# Patient Record
Sex: FEMALE | Race: WHITE | Employment: OTHER | URBAN - METROPOLITAN AREA
[De-identification: names, ages, dates, MRNs, and addresses within clinical notes are randomized per-mention and may not be internally consistent; named-entity substitution may affect disease eponyms.]

---

## 2017-03-07 ENCOUNTER — OFFICE VISIT (OUTPATIENT)
Dept: INTERNAL MEDICINE CLINIC | Facility: CLINIC | Age: 62
End: 2017-03-07

## 2017-03-07 ENCOUNTER — TELEPHONE (OUTPATIENT)
Dept: INTERNAL MEDICINE CLINIC | Facility: CLINIC | Age: 62
End: 2017-03-07

## 2017-03-07 VITALS
SYSTOLIC BLOOD PRESSURE: 118 MMHG | RESPIRATION RATE: 12 BRPM | HEIGHT: 64 IN | WEIGHT: 158.81 LBS | DIASTOLIC BLOOD PRESSURE: 76 MMHG | BODY MASS INDEX: 27.11 KG/M2 | HEART RATE: 59 BPM | TEMPERATURE: 98 F

## 2017-03-07 DIAGNOSIS — Z00.00 PHYSICAL EXAM, ANNUAL: ICD-10-CM

## 2017-03-07 DIAGNOSIS — E78.00 HYPERCHOLESTEREMIA: ICD-10-CM

## 2017-03-07 DIAGNOSIS — R25.3 MUSCLE TWITCH: ICD-10-CM

## 2017-03-07 DIAGNOSIS — Z78.0 POSTMENOPAUSAL: ICD-10-CM

## 2017-03-07 DIAGNOSIS — Z12.31 SCREENING MAMMOGRAM, ENCOUNTER FOR: ICD-10-CM

## 2017-03-07 DIAGNOSIS — M25.511 CHRONIC RIGHT SHOULDER PAIN: ICD-10-CM

## 2017-03-07 DIAGNOSIS — G89.29 CHRONIC RIGHT SHOULDER PAIN: ICD-10-CM

## 2017-03-07 PROCEDURE — 99396 PREV VISIT EST AGE 40-64: CPT | Performed by: INTERNAL MEDICINE

## 2017-03-07 RX ORDER — BETAMETHASONE VALERATE 1.2 MG/G
AEROSOL, FOAM TOPICAL
Refills: 2 | COMMUNITY
Start: 2017-01-10 | End: 2017-10-25

## 2017-03-07 NOTE — PROGRESS NOTES
[facility]    HPI:   Adrián Mercer is a 64year old female who presents for a complete physical exam. Symptoms: denies discharge, itching, burning or dysuria. Hyperlipidemia: on simvastatin. Tolerating well. No muscle aches.      Complains o arthroscopy   • Hot flashes 2/1/12   • Routine general medical examination at a health care facility 2/1/12   • Osteopenia 11/3/11     severe, bordering on OP   • Lipid screening 7/3/12   • Shingles 5/15          Past Surgical History    FEMUR/KNEE SURG UN (36.4 °C) (Oral)  Resp 12  Ht 64\"  Wt 158 lb 12.8 oz  BMI 27.24 kg/m2  Body mass index is 27.24 kg/(m^2).    GENERAL: well developed, well nourished,in no apparent distress  SKIN: no rashes,no suspicious lesions  HEENT: atraumatic, normocephalic,ears and t 09:16 AM   TRIG 94 05/14/2016 09:16 AM    05/14/2016 09:16 AM   NONHDLC 142 05/14/2016 09:16 AM       No results found for: A1C, HGBA1C   Vitamin D:    No results found for: VITD        ASSESSMENT AND PLAN:   Valeri Living is a 64year old female

## 2017-03-10 LAB
ABSOLUTE BASOPHILS: 28 CELLS/UL (ref 0–200)
ABSOLUTE EOSINOPHILS: 118 CELLS/UL (ref 15–500)
ABSOLUTE LYMPHOCYTES: 1484 CELLS/UL (ref 850–3900)
ABSOLUTE MONOCYTES: 325 CELLS/UL (ref 200–950)
ABSOLUTE NEUTROPHILS: 3646 CELLS/UL (ref 1500–7800)
ALBUMIN/GLOBULIN RATIO: 1.4 (CALC) (ref 1–2.5)
ALBUMIN: 4.3 G/DL (ref 3.6–5.1)
ALKALINE PHOSPHATASE: 82 U/L (ref 33–130)
ALT: 15 U/L (ref 6–29)
AST: 16 U/L (ref 10–35)
BASOPHILS: 0.5 %
BILIRUBIN, TOTAL: 0.4 MG/DL (ref 0.2–1.2)
BUN: 11 MG/DL (ref 7–25)
CALCIUM: 9.3 MG/DL (ref 8.6–10.4)
CARBON DIOXIDE: 28 MMOL/L (ref 20–31)
CHLORIDE: 105 MMOL/L (ref 98–110)
CHOL/HDLC RATIO: 3.3 (CALC)
CHOLESTEROL, TOTAL: 210 MG/DL (ref 125–200)
CREATININE: 0.9 MG/DL (ref 0.5–0.99)
EGFR IF AFRICN AM: 80 ML/MIN/1.73M2
EGFR IF NONAFRICN AM: 69 ML/MIN/1.73M2
EOSINOPHILS: 2.1 %
FOLATE, SERUM: >24 NG/ML
GLOBULIN: 3 G/DL (CALC) (ref 1.9–3.7)
GLUCOSE: 92 MG/DL (ref 65–99)
HDL CHOLESTEROL: 63 MG/DL
HEMATOCRIT: 39.3 % (ref 35–45)
HEMOGLOBIN: 13.1 G/DL (ref 11.7–15.5)
LDL-CHOLESTEROL: 131 MG/DL (CALC)
LYMPHOCYTES: 26.5 %
MCH: 29.7 PG (ref 27–33)
MCHC: 33.4 G/DL (ref 32–36)
MCV: 88.9 FL (ref 80–100)
MONOCYTES: 5.8 %
MPV: 9.7 FL (ref 7.5–12.5)
NEUTROPHILS: 65.1 %
NON-HDL CHOLESTEROL: 147 MG/DL (CALC)
PLATELET COUNT: 211 THOUSAND/UL (ref 140–400)
POTASSIUM: 4.5 MMOL/L (ref 3.5–5.3)
PROTEIN, TOTAL: 7.3 G/DL (ref 6.1–8.1)
RDW: 12.9 % (ref 11–15)
RED BLOOD CELL COUNT: 4.42 MILLION/UL (ref 3.8–5.1)
SODIUM: 140 MMOL/L (ref 135–146)
TRIGLYCERIDES: 78 MG/DL
TSH: 1.55 MIU/L (ref 0.4–4.5)
VITAMIN B12: 477 PG/ML (ref 200–1100)
VITAMIN D, 25-OH, TOTAL: 38 NG/ML (ref 30–100)
WHITE BLOOD CELL COUNT: 5.6 THOUSAND/UL (ref 3.8–10.8)

## 2017-04-06 ENCOUNTER — TELEPHONE (OUTPATIENT)
Dept: INTERNAL MEDICINE CLINIC | Facility: CLINIC | Age: 62
End: 2017-04-06

## 2017-04-06 NOTE — TELEPHONE ENCOUNTER
Patient called and is leaving work, not feeling well at all wanted to possibly be seen today. I offered patient spot with Dr. Eloisa Toney at 3:00, but patient did not want that time.  Patient did seem interested in a follow up appointment for tomorrow with Dr. Smiley Velasquez

## 2017-04-06 NOTE — TELEPHONE ENCOUNTER
[facility] [place] on 3/29/17. Patient was diagnosed with strep throat and gave the patient amoxicillin, patient said she was a little better and the pain her throat is gone.  But the patient is starting back

## 2017-04-06 NOTE — TELEPHONE ENCOUNTER
Left message on pt's voice mail. Pt asked to call back with condition update.   Faxed Medical Records request to [facility] in [place] at [address]

## 2017-04-07 NOTE — TELEPHONE ENCOUNTER
Incoming (mail or fax): Fax  Received from:  [facility] Notes from [facility]. Documentation given to:  Placed on [employer].

## 2017-04-10 NOTE — TELEPHONE ENCOUNTER
Received fax from [id number] re:  Pt visit 3/29/17 and 4/6/17. To consult folder. Spoke with pt. States that she has completed 10-day course of ATB and is better. States that she has no nasal drainage and aches are gone.   States that

## 2017-05-15 PROBLEM — M75.01 ADHESIVE CAPSULITIS OF RIGHT SHOULDER: Status: ACTIVE | Noted: 2017-05-15

## 2017-06-02 DIAGNOSIS — E78.00 HYPERCHOLESTEREMIA: ICD-10-CM

## 2017-06-02 RX ORDER — SIMVASTATIN 10 MG
10 TABLET ORAL
Qty: 90 TABLET | Refills: 0 | Status: SHIPPED | OUTPATIENT
Start: 2017-06-02 | End: 2017-09-08

## 2017-06-02 NOTE — TELEPHONE ENCOUNTER
From: Edwardo Sargent  To:  Eduardo Oseguera MD  Sent: 6/2/2017 6:36 AM CDT  Subject: Medication Renewal Request    Original authorizing provider: MD Edwardo Barbosa would like a refill of the following medications:  simvastatin 10 MG Oral Ta

## 2017-07-20 ENCOUNTER — TELEPHONE (OUTPATIENT)
Dept: INTERNAL MEDICINE CLINIC | Facility: CLINIC | Age: 62
End: 2017-07-20

## 2017-07-20 DIAGNOSIS — N63.0 BREAST NODULE: Primary | ICD-10-CM

## 2017-07-20 NOTE — TELEPHONE ENCOUNTER
Noted below. See imaging 16 test scan, repeat Left breast US recommended. See Imaging 16 US order . [facility] requesting order for left breast US limited. Please advise. OK to order? Order pended.

## 2017-07-20 NOTE — TELEPHONE ENCOUNTER
MARGARITA called to request an order for a left breast ultrasound limited. She has a mammogram scheduled for July 26.   Please fax the order to [phone]

## 2017-07-21 NOTE — TELEPHONE ENCOUNTER
Looks like last breast ultrasound was ordered by dr. Gini Carter. Has she been getting mammograms from his office? She should call their office to have them do another order for her if so.

## 2017-07-21 NOTE — TELEPHONE ENCOUNTER
Called MARGARITA, advised that we did not order mammogram and to call Dr Donn Leal office for breast US order.

## 2017-07-25 ENCOUNTER — TELEPHONE (OUTPATIENT)
Dept: INTERNAL MEDICINE CLINIC | Facility: CLINIC | Age: 62
End: 2017-07-25

## 2017-07-25 NOTE — TELEPHONE ENCOUNTER
Script was sent to last pharmacy on 6/2/17, regardless of which pharmacy she is going to now, it is refill too soon if she picked that one up.  Pt to call back if she never picked up script, otherwise she can make request end of august for refill to new pha

## 2017-07-25 NOTE — TELEPHONE ENCOUNTER
Patient need new rx of Simvastatin 10 mg, 3 month supply,  at new pharmacy - [facility] [address]  [address] [place]

## 2017-07-27 ENCOUNTER — TELEPHONE (OUTPATIENT)
Dept: INTERNAL MEDICINE CLINIC | Facility: CLINIC | Age: 62
End: 2017-07-27

## 2017-07-27 NOTE — TELEPHONE ENCOUNTER
Incoming (mail or fax): fax  Received from:  [facility]  Documentation given to:  triage

## 2017-07-28 NOTE — TELEPHONE ENCOUNTER
Spoke with pt, advised of results and recommendations.  Copy of dexa scan mailed to pt per pt request.

## 2017-07-28 NOTE — TELEPHONE ENCOUNTER
Mammogram benign. Repeat in 1 year. dexa shows osteopenia, slightly worse in lumbar spine when compared to previous dexa. Take calcium and vit d supplements regularly. Do regular weight bearing exercises. Please inform pt.

## 2017-07-31 ENCOUNTER — TELEPHONE (OUTPATIENT)
Dept: INTERNAL MEDICINE CLINIC | Facility: CLINIC | Age: 62
End: 2017-07-31

## 2017-07-31 NOTE — TELEPHONE ENCOUNTER
Dexa Bone Scan report received from [facility]. Date of exam: 7/26/17.

## 2017-07-31 NOTE — TELEPHONE ENCOUNTER
Incoming (mail or fax):  mail  Received from:  [facility]  Documentation given to:  triage

## 2017-08-15 ENCOUNTER — TELEPHONE (OUTPATIENT)
Dept: INTERNAL MEDICINE CLINIC | Facility: CLINIC | Age: 62
End: 2017-08-15

## 2017-08-15 NOTE — TELEPHONE ENCOUNTER
Spoke with pt, strongly recommended she see gyne to evaluate cause for postmenopausal bleeding as this is not normal. Gave contact info for Jarred Abdullahi and Geronimo Walker.  Pt used to see Dr Reid Tesfaye so will try that office first. Reminded pt she is PPO so can see d

## 2017-08-15 NOTE — TELEPHONE ENCOUNTER
Patient called and wanted to talk to Dr. Concepcion Goodell nurse, patient had a couple questions/concerns and wanted to know if she needs a appointment. Patient said she just started to bleed today, after 6 years of menopause. Please advise patient, thank you!

## 2017-08-16 ENCOUNTER — OFFICE VISIT (OUTPATIENT)
Dept: OBGYN CLINIC | Facility: CLINIC | Age: 62
End: 2017-08-16

## 2017-08-16 VITALS
SYSTOLIC BLOOD PRESSURE: 112 MMHG | WEIGHT: 160 LBS | RESPIRATION RATE: 12 BRPM | HEIGHT: 64 IN | TEMPERATURE: 99 F | DIASTOLIC BLOOD PRESSURE: 84 MMHG | BODY MASS INDEX: 27.31 KG/M2 | HEART RATE: 60 BPM

## 2017-08-16 DIAGNOSIS — R35.0 URINARY FREQUENCY: ICD-10-CM

## 2017-08-16 DIAGNOSIS — N95.0 POST-MENOPAUSAL BLEEDING: Primary | ICD-10-CM

## 2017-08-16 LAB
APPEARANCE: CLEAR
BILIRUBIN: NEGATIVE
GLUCOSE (URINE DIPSTICK): NEGATIVE MG/DL
KETONES (URINE DIPSTICK): NEGATIVE MG/DL
MULTISTIX LOT#: NORMAL NUMERIC
NITRITE, URINE: NEGATIVE
OCCULT BLOOD: NEGATIVE
PH, URINE: 7 (ref 4.5–8)
PROTEIN (URINE DIPSTICK): NEGATIVE MG/DL
SPECIFIC GRAVITY: >=1.03 (ref 1–1.03)
URINE-COLOR: YELLOW
UROBILINOGEN,SEMI-QN: 0.2 MG/DL (ref 0–1.9)

## 2017-08-16 PROCEDURE — 81002 URINALYSIS NONAUTO W/O SCOPE: CPT | Performed by: OBSTETRICS & GYNECOLOGY

## 2017-08-16 PROCEDURE — 87086 URINE CULTURE/COLONY COUNT: CPT | Performed by: OBSTETRICS & GYNECOLOGY

## 2017-08-16 PROCEDURE — 99204 OFFICE O/P NEW MOD 45 MIN: CPT | Performed by: OBSTETRICS & GYNECOLOGY

## 2017-08-16 PROCEDURE — 88175 CYTOPATH C/V AUTO FLUID REDO: CPT | Performed by: OBSTETRICS & GYNECOLOGY

## 2017-08-16 NOTE — PROGRESS NOTES
Pt c/o post  Menopausal bleeding. Has not been sexually active for many years. No cramping. Pt is experiencing urinary frequency.

## 2017-08-16 NOTE — PROGRESS NOTES
GYN H&P     2017  2:29 PM    CC: Patient is here to establish care with PMB noticed yesterday. Also noticed some urgency of urination    HPI: Patient is a 58year old  . Notice mall amount of blood on toilet paper 2 times.  Sure it was vaginal a COLONOSCOPY      Comment: Repeat in 5 years. Normal colonoscopy  4/27/12: COLONOSCOPY,BIOPSY      Comment: Repeat in 3 years.  colon polyps, internal                hemorrhoids  2008: D & C  2005: FEMUR/KNEE SURG UNLISTED      Comment: L knee repair meniscu perineal support  Urethra: meatus normal   Bladder: well supported  Vagina: normal mucosa, no lesions,  discharge .  No blood today  Uterus: normal size, mobile, nontender  Cervix: normal os, no lesions or bleeding  Adnexa:normal size, bilaterally nontender

## 2017-08-18 LAB — HPV I/H RISK 1 DNA SPEC QL NAA+PROBE: NEGATIVE

## 2017-09-08 DIAGNOSIS — E78.00 HYPERCHOLESTEREMIA: ICD-10-CM

## 2017-09-08 RX ORDER — SIMVASTATIN 10 MG
10 TABLET ORAL
Qty: 90 TABLET | Refills: 0 | Status: SHIPPED | OUTPATIENT
Start: 2017-09-08 | End: 2018-04-09

## 2017-09-08 NOTE — TELEPHONE ENCOUNTER
Was patient advised to contact pharmacy directly?:  Yes - but this is new pharmacy    Is patient out of meds or supply very low?:  Low, 10 left    Medication Requested:  Simvastatin    Dose:  10 mg    Is patient requesting a 30 or 90 day supply?:  90 days

## 2017-10-24 ENCOUNTER — TELEPHONE (OUTPATIENT)
Dept: INTERNAL MEDICINE CLINIC | Facility: CLINIC | Age: 62
End: 2017-10-24

## 2017-10-24 NOTE — TELEPHONE ENCOUNTER
Patient has lower back pain. She is wondering if Dr Derrick Holloway would want to see her or does Dr Derrick Holloway have a recommendation of where she should go. Please advise.

## 2017-10-24 NOTE — TELEPHONE ENCOUNTER
If she's not seen dr for back pain and there are no other symptoms, you can schedule with Dr Albaro Hutchinson.

## 2017-10-25 ENCOUNTER — OFFICE VISIT (OUTPATIENT)
Dept: INTERNAL MEDICINE CLINIC | Facility: CLINIC | Age: 62
End: 2017-10-25

## 2017-10-25 VITALS
DIASTOLIC BLOOD PRESSURE: 58 MMHG | RESPIRATION RATE: 14 BRPM | WEIGHT: 154 LBS | SYSTOLIC BLOOD PRESSURE: 102 MMHG | HEIGHT: 64 IN | HEART RATE: 60 BPM | BODY MASS INDEX: 26.29 KG/M2

## 2017-10-25 DIAGNOSIS — M54.42 ACUTE LEFT-SIDED LOW BACK PAIN WITH LEFT-SIDED SCIATICA: Primary | ICD-10-CM

## 2017-10-25 PROCEDURE — 99213 OFFICE O/P EST LOW 20 MIN: CPT | Performed by: NURSE PRACTITIONER

## 2017-10-25 RX ORDER — CYCLOBENZAPRINE HCL 5 MG
5 TABLET ORAL 3 TIMES DAILY PRN
Qty: 10 TABLET | Refills: 0 | Status: SHIPPED | OUTPATIENT
Start: 2017-10-25 | End: 2018-03-08

## 2017-10-25 RX ORDER — BETAMETHASONE VALERATE 1.2 MG/G
AEROSOL, FOAM TOPICAL
Qty: 1 CAN | Refills: 2 | Status: SHIPPED | OUTPATIENT
Start: 2017-10-25 | End: 2019-03-01

## 2017-10-25 RX ORDER — NAPROXEN 500 MG/1
500 TABLET ORAL 2 TIMES DAILY WITH MEALS
Qty: 14 TABLET | Refills: 0 | Status: SHIPPED | OUTPATIENT
Start: 2017-10-25 | End: 2018-03-08

## 2017-10-25 NOTE — PATIENT INSTRUCTIONS
Take Naproxen, one tab twice daily until all tabs are gone. Space doses 12 hours apart for best effect. TAKE DOSES WITH FOOD. Take one cyclobenzaprine tab nightly, before bed, for three (3) nights. Then make take nightly as needed.  This me

## 2017-10-25 NOTE — PROGRESS NOTES
Patient presents with:  Back Pain: x 1 month. hurts more when going from sitting to standing x last week      HPI:  Presents with approx 1 month history of left lower back pain with a worsening in the past week.  Pain is made worse by going from sitting to (10 mg total) by mouth once daily. Disp: 90 tablet Rfl: 0   acetaminophen 500 MG Oral Tab Take 1,000 mg by mouth.  Disp:  Rfl:        Physical Exam  /58 (BP Location: Left arm, Patient Position: Sitting, Cuff Size: adult)   Pulse 60   Resp 14   Ht 64\ for three (3) nights. Then make take nightly as needed. This medication will likely make you drowsy. Do not drive, operate machinery or make important decisions after taking this medication. Do not drink alcohol while taking this medication.     Apply warm

## 2017-11-14 ENCOUNTER — TELEPHONE (OUTPATIENT)
Dept: INTERNAL MEDICINE CLINIC | Facility: CLINIC | Age: 62
End: 2017-11-14

## 2017-12-12 ENCOUNTER — TELEPHONE (OUTPATIENT)
Dept: INTERNAL MEDICINE CLINIC | Facility: CLINIC | Age: 62
End: 2017-12-12

## 2017-12-12 NOTE — TELEPHONE ENCOUNTER
Forms have been received for physical therapy from [facility].  Given to JV to review and sign

## 2018-03-08 ENCOUNTER — OFFICE VISIT (OUTPATIENT)
Dept: INTERNAL MEDICINE CLINIC | Facility: CLINIC | Age: 63
End: 2018-03-08

## 2018-03-08 VITALS
SYSTOLIC BLOOD PRESSURE: 110 MMHG | HEIGHT: 64 IN | RESPIRATION RATE: 12 BRPM | BODY MASS INDEX: 25.61 KG/M2 | WEIGHT: 150 LBS | HEART RATE: 72 BPM | DIASTOLIC BLOOD PRESSURE: 68 MMHG | TEMPERATURE: 97 F

## 2018-03-08 DIAGNOSIS — Z12.31 SCREENING MAMMOGRAM, ENCOUNTER FOR: ICD-10-CM

## 2018-03-08 DIAGNOSIS — Z00.00 PHYSICAL EXAM, ANNUAL: ICD-10-CM

## 2018-03-08 DIAGNOSIS — M85.88 OSTEOPENIA OF SPINE: ICD-10-CM

## 2018-03-08 DIAGNOSIS — R00.2 PALPITATIONS: ICD-10-CM

## 2018-03-08 PROCEDURE — 99396 PREV VISIT EST AGE 40-64: CPT | Performed by: INTERNAL MEDICINE

## 2018-03-08 PROCEDURE — 99214 OFFICE O/P EST MOD 30 MIN: CPT | Performed by: INTERNAL MEDICINE

## 2018-03-08 PROCEDURE — 93000 ELECTROCARDIOGRAM COMPLETE: CPT | Performed by: INTERNAL MEDICINE

## 2018-03-08 NOTE — PROGRESS NOTES
[facility]    CHIEF COMPLAINT: Patient presents with:  Routine Physical: 8/16/17 normal pap, neg HPV        HPI:   Wild Ashyb is a 58year old female who presents for a complete physical exam. Symptoms: denies discharge, itching, burning or simvastatin 10 MG Oral Tab Take 1 tablet (10 mg total) by mouth once daily.  Disp: 90 tablet Rfl: 0      Past Medical History:   Diagnosis Date   • Colon polyps 4/27/12    tubular adenomas   • Eczema    • Hot flashes 2/1/12   • Hypercholesteremia 2/8/11 vision  HEENT: denies nasal congestion, sinus pain or ST  LUNGS: denies shortness of breath with exertion  CARDIOVASCULAR: denies chest pain on exertion  GI: denies abdominal pain,denies heartburn  : denies dysuria, vaginal discharge or itching  MUSCULOS 03/09/2017 08:36 AM          Lab Results  Component Value Date/Time   CHOLEST 210 (H) 03/09/2017 08:36 AM   HDL 63 03/09/2017 08:36 AM   TRIG 78 03/09/2017 08:36 AM    (H) 03/09/2017 08:36 AM   NONHDLC 147 03/09/2017 08:36 AM       No results found

## 2018-03-14 ENCOUNTER — HOSPITAL ENCOUNTER (OUTPATIENT)
Dept: CV DIAGNOSTICS | Age: 63
Discharge: HOME | End: 2018-03-14
Attending: INTERNAL MEDICINE
Payer: COMMERCIAL

## 2018-03-14 DIAGNOSIS — R00.2 PALPITATIONS: ICD-10-CM

## 2018-03-14 LAB
ABSOLUTE BASOPHILS: 29 CELLS/UL (ref 0–200)
ABSOLUTE EOSINOPHILS: 171 CELLS/UL (ref 15–500)
ABSOLUTE LYMPHOCYTES: 1778 CELLS/UL (ref 850–3900)
ABSOLUTE MONOCYTES: 331 CELLS/UL (ref 200–950)
ABSOLUTE NEUTROPHILS: 3392 CELLS/UL (ref 1500–7800)
ALBUMIN/GLOBULIN RATIO: 1.4 (CALC) (ref 1–2.5)
ALBUMIN: 4.1 G/DL (ref 3.6–5.1)
ALKALINE PHOSPHATASE: 82 U/L (ref 33–130)
ALT: 15 U/L (ref 6–29)
AST: 15 U/L (ref 10–35)
BASOPHILS: 0.5 %
BILIRUBIN, TOTAL: 0.4 MG/DL (ref 0.2–1.2)
BUN: 16 MG/DL (ref 7–25)
CALCIUM: 9.3 MG/DL (ref 8.6–10.4)
CARBON DIOXIDE: 29 MMOL/L (ref 20–31)
CHLORIDE: 104 MMOL/L (ref 98–110)
CHOL/HDLC RATIO: 3.5 (CALC)
CHOLESTEROL, TOTAL: 198 MG/DL
CREATININE: 0.93 MG/DL (ref 0.5–0.99)
EGFR IF AFRICN AM: 76 ML/MIN/1.73M2
EGFR IF NONAFRICN AM: 66 ML/MIN/1.73M2
EOSINOPHILS: 3 %
FOLATE, SERUM: 22.9 NG/ML
GLOBULIN: 3 G/DL (CALC) (ref 1.9–3.7)
GLUCOSE: 94 MG/DL (ref 65–99)
HDL CHOLESTEROL: 57 MG/DL
HEMATOCRIT: 39.4 % (ref 35–45)
HEMOGLOBIN: 13.4 G/DL (ref 11.7–15.5)
LDL-CHOLESTEROL: 120 MG/DL (CALC)
LYMPHOCYTES: 31.2 %
MCH: 30.1 PG (ref 27–33)
MCHC: 34 G/DL (ref 32–36)
MCV: 88.5 FL (ref 80–100)
MONOCYTES: 5.8 %
MPV: 10.8 FL (ref 7.5–12.5)
NEUTROPHILS: 59.5 %
NON-HDL CHOLESTEROL: 141 MG/DL (CALC)
PLATELET COUNT: 234 THOUSAND/UL (ref 140–400)
POTASSIUM: 4.1 MMOL/L (ref 3.5–5.3)
PROTEIN, TOTAL: 7.1 G/DL (ref 6.1–8.1)
RDW: 12.3 % (ref 11–15)
RED BLOOD CELL COUNT: 4.45 MILLION/UL (ref 3.8–5.1)
SODIUM: 140 MMOL/L (ref 135–146)
TRIGLYCERIDES: 105 MG/DL
TSH W/REFLEX TO FT4: 2.48 MIU/L (ref 0.4–4.5)
VITAMIN B12: 475 PG/ML (ref 200–1100)
VITAMIN D, 25-OH, TOTAL: 42 NG/ML (ref 30–100)
WHITE BLOOD CELL COUNT: 5.7 THOUSAND/UL (ref 3.8–10.8)

## 2018-03-14 PROCEDURE — 93306 TTE W/DOPPLER COMPLETE: CPT | Performed by: INTERNAL MEDICINE

## 2018-04-04 ENCOUNTER — HOSPITAL ENCOUNTER (OUTPATIENT)
Dept: CV DIAGNOSTICS | Age: 63
Discharge: HOME | End: 2018-04-04
Attending: INTERNAL MEDICINE
Payer: COMMERCIAL

## 2018-04-04 DIAGNOSIS — R00.2 PALPITATIONS: ICD-10-CM

## 2018-04-04 PROCEDURE — 93225 XTRNL ECG REC<48 HRS REC: CPT | Performed by: INTERNAL MEDICINE

## 2018-04-04 PROCEDURE — 93227 XTRNL ECG REC<48 HR R&I: CPT | Performed by: INTERNAL MEDICINE

## 2018-04-04 PROCEDURE — 93226 XTRNL ECG REC<48 HR SCAN A/R: CPT | Performed by: INTERNAL MEDICINE

## 2018-04-09 DIAGNOSIS — E78.00 HYPERCHOLESTEREMIA: ICD-10-CM

## 2018-04-09 RX ORDER — SIMVASTATIN 10 MG
10 TABLET ORAL
Qty: 90 TABLET | Refills: 0 | Status: SHIPPED | OUTPATIENT
Start: 2018-04-09 | End: 2018-08-21

## 2018-05-01 ENCOUNTER — OFFICE VISIT (OUTPATIENT)
Dept: INTERNAL MEDICINE CLINIC | Facility: CLINIC | Age: 63
End: 2018-05-01

## 2018-05-01 VITALS
SYSTOLIC BLOOD PRESSURE: 112 MMHG | WEIGHT: 150.88 LBS | RESPIRATION RATE: 12 BRPM | HEART RATE: 60 BPM | HEIGHT: 64 IN | BODY MASS INDEX: 25.76 KG/M2 | DIASTOLIC BLOOD PRESSURE: 66 MMHG | TEMPERATURE: 98 F

## 2018-05-01 DIAGNOSIS — R00.2 PALPITATIONS: Primary | ICD-10-CM

## 2018-05-01 PROBLEM — I49.1 PAC (PREMATURE ATRIAL CONTRACTION): Status: ACTIVE | Noted: 2018-05-01

## 2018-05-01 PROCEDURE — 99213 OFFICE O/P EST LOW 20 MIN: CPT | Performed by: INTERNAL MEDICINE

## 2018-05-19 ENCOUNTER — HOSPITAL ENCOUNTER (OUTPATIENT)
Dept: CT IMAGING | Facility: HOSPITAL | Age: 63
Discharge: HOME | End: 2018-05-19
Attending: INTERNAL MEDICINE

## 2018-05-19 DIAGNOSIS — Z13.9 SCREENING PROCEDURE: ICD-10-CM

## 2018-05-23 ENCOUNTER — TELEPHONE (OUTPATIENT)
Dept: INTERNAL MEDICINE CLINIC | Facility: CLINIC | Age: 63
End: 2018-05-23

## 2018-05-23 NOTE — PROGRESS NOTES
Spoke to pt, aware of results and to start baby ASA. Pt voiced understanding. States her palpitations are stable, have not increased at all.

## 2018-08-21 DIAGNOSIS — E78.00 HYPERCHOLESTEREMIA: ICD-10-CM

## 2018-08-21 RX ORDER — SIMVASTATIN 10 MG
10 TABLET ORAL
Qty: 90 TABLET | Refills: 0 | Status: SHIPPED
Start: 2018-08-21 | End: 2018-12-07

## 2018-08-21 NOTE — TELEPHONE ENCOUNTER
From: Migdalia Ayala  Sent: 8/21/2018 9:48 AM CDT  Subject: Medication Renewal Request    Beto Parsons would like a refill of the following medications:     simvastatin 10 MG Oral Tab Sharda Cameron MD]    Preferred pharmacy: [address] [place] [address] -

## 2018-09-11 ENCOUNTER — HOSPITAL ENCOUNTER (OUTPATIENT)
Dept: CARDIOLOGY CLINIC | Facility: HOSPITAL | Age: 63
Discharge: HOME | End: 2018-09-11
Attending: INTERNAL MEDICINE

## 2018-09-11 DIAGNOSIS — Z91.89 STROKE RISK: ICD-10-CM

## 2018-11-30 ENCOUNTER — TELEPHONE (OUTPATIENT)
Dept: INTERNAL MEDICINE CLINIC | Facility: CLINIC | Age: 63
End: 2018-11-30

## 2018-11-30 NOTE — TELEPHONE ENCOUNTER
Incoming (mail or fax):   Fax  Received from:   [facility]  Documentation given to:  Left documentation in Dr Caterina Candelaria results bin

## 2018-12-03 NOTE — TELEPHONE ENCOUNTER
Mammogram screening report dated 11/30/18 received from [facility]. HM updated for 1 year screening recommended. Routed to Dr La Arrington for review.

## 2018-12-07 ENCOUNTER — TELEPHONE (OUTPATIENT)
Dept: INTERNAL MEDICINE CLINIC | Facility: CLINIC | Age: 63
End: 2018-12-07

## 2018-12-07 DIAGNOSIS — E78.00 HYPERCHOLESTEREMIA: ICD-10-CM

## 2018-12-11 RX ORDER — SIMVASTATIN 10 MG
10 TABLET ORAL
Qty: 90 TABLET | Refills: 0 | Status: SHIPPED | OUTPATIENT
Start: 2018-12-11 | End: 2019-02-01

## 2019-02-01 ENCOUNTER — OFFICE VISIT (OUTPATIENT)
Dept: INTERNAL MEDICINE CLINIC | Facility: CLINIC | Age: 64
End: 2019-02-01
Payer: COMMERCIAL

## 2019-02-01 VITALS
HEART RATE: 60 BPM | HEIGHT: 63.75 IN | SYSTOLIC BLOOD PRESSURE: 130 MMHG | WEIGHT: 156.81 LBS | TEMPERATURE: 98 F | RESPIRATION RATE: 12 BRPM | DIASTOLIC BLOOD PRESSURE: 74 MMHG | BODY MASS INDEX: 27.1 KG/M2

## 2019-02-01 DIAGNOSIS — E78.00 HYPERCHOLESTEREMIA: ICD-10-CM

## 2019-02-01 DIAGNOSIS — Z00.00 PHYSICAL EXAM, ANNUAL: Primary | ICD-10-CM

## 2019-02-01 DIAGNOSIS — Z78.0 POSTMENOPAUSAL: ICD-10-CM

## 2019-02-01 DIAGNOSIS — Z12.31 ENCOUNTER FOR SCREENING MAMMOGRAM FOR BREAST CANCER: ICD-10-CM

## 2019-02-01 PROCEDURE — 99396 PREV VISIT EST AGE 40-64: CPT | Performed by: INTERNAL MEDICINE

## 2019-02-01 RX ORDER — SIMVASTATIN 10 MG
10 TABLET ORAL
Qty: 90 TABLET | Refills: 3 | Status: SHIPPED | OUTPATIENT
Start: 2019-02-01 | End: 2020-01-04

## 2019-02-01 RX ORDER — PIMECROLIMUS 1 %
CREAM (GRAM) TOPICAL AS NEEDED
COMMUNITY
Start: 2018-08-23 | End: 2021-07-07 | Stop reason: ALTCHOICE

## 2019-02-01 NOTE — PROGRESS NOTES
[address]    CHIEF COMPLAINT: Patient presents with:  Routine Physical: no longer sees gyne, Dr. Vanna Mcnamara. 11/5/15- colonoscopy, repeat in 5 years. 4/28/15- Tdap.  9/22/18- flu.   11/30/18- mammogram.        HPI:   Carla Gomez is a 61 year ol keratosis 5/14/12    forehead   • Shingles 5/15   • Verruca vulgaris 5/14/12    L upper eye lid      Past Surgical History:   Procedure Laterality Date   • COLONOSCOPY  11/5/15    Repeat in 5 years.  Normal colonoscopy   • COLONOSCOPY,BIOPSY  4/27/12    Rep (Oral)   Resp 12   Ht 63.75\"   Wt 156 lb 12.8 oz   LMP  (LMP Unknown)   BMI 27.13 kg/m²   Body mass index is 27.13 kg/m².    GENERAL: well developed, well nourished,in no apparent distress  SKIN: no rashes,no suspicious lesions  HEENT: atraumatic, normocep 03/13/2018 08:43 AM    HDL 57 03/13/2018 08:43 AM    TRIG 105 03/13/2018 08:43 AM     (H) 03/13/2018 08:43 AM    NONHDLC 141 (H) 03/13/2018 08:43 AM       No results found for: A1C   Vitamin D:    No results found for: VITD        ASSESSMENT AND JOHN

## 2019-02-21 LAB
ABSOLUTE BASOPHILS: 61 CELLS/UL (ref 0–200)
ABSOLUTE EOSINOPHILS: 250 CELLS/UL (ref 15–500)
ABSOLUTE LYMPHOCYTES: 1678 CELLS/UL (ref 850–3900)
ABSOLUTE MONOCYTES: 458 CELLS/UL (ref 200–950)
ABSOLUTE NEUTROPHILS: 3654 CELLS/UL (ref 1500–7800)
ALBUMIN/GLOBULIN RATIO: 1.7 (CALC) (ref 1–2.5)
ALBUMIN: 4.3 G/DL (ref 3.6–5.1)
ALKALINE PHOSPHATASE: 62 U/L (ref 33–130)
ALT: 18 U/L (ref 6–29)
AST: 22 U/L (ref 10–35)
BASOPHILS: 1 %
BILIRUBIN, TOTAL: 0.4 MG/DL (ref 0.2–1.2)
BUN: 14 MG/DL (ref 7–25)
CALCIUM: 9.2 MG/DL (ref 8.6–10.4)
CARBON DIOXIDE: 29 MMOL/L (ref 20–32)
CHLORIDE: 105 MMOL/L (ref 98–110)
CHOL/HDLC RATIO: 3.2 (CALC)
CHOLESTEROL, TOTAL: 200 MG/DL
CREATININE: 0.9 MG/DL (ref 0.5–0.99)
EGFR IF AFRICN AM: 79 ML/MIN/1.73M2
EGFR IF NONAFRICN AM: 68 ML/MIN/1.73M2
EOSINOPHILS: 4.1 %
GLOBULIN: 2.6 G/DL (CALC) (ref 1.9–3.7)
GLUCOSE: 87 MG/DL (ref 65–99)
HDL CHOLESTEROL: 62 MG/DL
HEMATOCRIT: 39.1 % (ref 35–45)
HEMOGLOBIN: 13.3 G/DL (ref 11.7–15.5)
LDL-CHOLESTEROL: 116 MG/DL (CALC)
LYMPHOCYTES: 27.5 %
MCH: 30.2 PG (ref 27–33)
MCHC: 34 G/DL (ref 32–36)
MCV: 88.7 FL (ref 80–100)
MONOCYTES: 7.5 %
MPV: 10.7 FL (ref 7.5–12.5)
NEUTROPHILS: 59.9 %
NON-HDL CHOLESTEROL: 138 MG/DL (CALC)
PLATELET COUNT: 241 THOUSAND/UL (ref 140–400)
POTASSIUM: 4.4 MMOL/L (ref 3.5–5.3)
PROTEIN, TOTAL: 6.9 G/DL (ref 6.1–8.1)
RDW: 12.3 % (ref 11–15)
RED BLOOD CELL COUNT: 4.41 MILLION/UL (ref 3.8–5.1)
SODIUM: 141 MMOL/L (ref 135–146)
TRIGLYCERIDES: 110 MG/DL
TSH W/REFLEX TO FT4: 2.36 MIU/L (ref 0.4–4.5)
WHITE BLOOD CELL COUNT: 6.1 THOUSAND/UL (ref 3.8–10.8)

## 2019-03-01 DIAGNOSIS — E78.00 HYPERCHOLESTEREMIA: ICD-10-CM

## 2019-03-04 RX ORDER — SIMVASTATIN 10 MG
10 TABLET ORAL
Qty: 90 TABLET | Refills: 0 | OUTPATIENT
Start: 2019-03-04

## 2019-03-04 NOTE — TELEPHONE ENCOUNTER
Last OV relevant to medication: 2/1/19  Last refill date: 12/25/17 Betamethasone Foam.  #1,2RF                          10/25/17 Hydrocortisone cream. #1,1RF  When pt was asked to return for OV: 1 year  Upcoming appt/reason: No appt scheduled.

## 2019-03-05 RX ORDER — BETAMETHASONE VALERATE 1.2 MG/G
AEROSOL, FOAM TOPICAL
Qty: 1 CAN | Refills: 0 | Status: SHIPPED | OUTPATIENT
Start: 2019-03-05

## 2019-03-05 NOTE — TELEPHONE ENCOUNTER
Fax notice from pharmacy that hydrocortisone needs more detail for frequency; Was ordered PRN. Added QD PRN. If applications more frequent, please advise. Pended. Thanks!

## 2019-03-05 NOTE — TELEPHONE ENCOUNTER
Patient responding stating that the Hydrocortisone is for anal itch & betamethasone is for scalp itch. Routed to Dr. Georgina Gaming.

## 2019-07-08 ENCOUNTER — TELEPHONE (OUTPATIENT)
Dept: INTERNAL MEDICINE CLINIC | Facility: CLINIC | Age: 64
End: 2019-07-08

## 2019-07-08 NOTE — TELEPHONE ENCOUNTER
Patient calling regarding Shingles vaccine stated she received at [facility] and had a reaction in April. Patient is due for her second vaccine stated she was advised to contact PCP regarding reaction. Please advise. Thank you!

## 2019-07-08 NOTE — TELEPHONE ENCOUNTER
Noted below. She already had the zostavax and has already had shingles. Given all her symptoms after the last shingrix I would not recommend the second shingrix vaccine.

## 2019-07-08 NOTE — TELEPHONE ENCOUNTER
Called and spoke with patient to advise of recommendation against second dose of Shingrix. Patient verbalized understanding.

## 2019-07-08 NOTE — TELEPHONE ENCOUNTER
States reaction to Shingles vaccine in April. States redness occurred 4 inches distal from injection site and radiated down arm. States it felt like a shingles rash (states has had shingles in the past).  Also states flu-like symptoms, low-grade fever, fati

## 2019-11-30 ENCOUNTER — TELEPHONE (OUTPATIENT)
Dept: INTERNAL MEDICINE CLINIC | Facility: CLINIC | Age: 64
End: 2019-11-30

## 2019-11-30 DIAGNOSIS — R92.2 INCONCLUSIVE MAMMOGRAM: Primary | ICD-10-CM

## 2019-11-30 DIAGNOSIS — R92.2 INCONCLUSIVE MAMMOGRAM DUE TO DENSE BREASTS: ICD-10-CM

## 2019-11-30 NOTE — TELEPHONE ENCOUNTER
Incoming (mail or fax):  fax  Received from:  Simi Ballard  Documentation given to:  Triage - Dr Patrice Rodriguez bin

## 2019-12-02 ENCOUNTER — TELEPHONE (OUTPATIENT)
Dept: INTERNAL MEDICINE CLINIC | Facility: CLINIC | Age: 64
End: 2019-12-02

## 2019-12-02 NOTE — TELEPHONE ENCOUNTER
Per patient request, faxed order for dexa scan to [facility] in [place] at [phone]

## 2019-12-02 NOTE — TELEPHONE ENCOUNTER
Received Mammogram results from [facility]  Updated HM  To Dr. La Arrington for review

## 2019-12-03 NOTE — TELEPHONE ENCOUNTER
mammo incomplete. She needs additional views. She will need to call the mammogram facility and schedule these. Please inform pt.

## 2019-12-03 NOTE — TELEPHONE ENCOUNTER
Noted superorder placed on 2/1/19. Ordered diagnostic mammogram & ultrasound. Printed both orders & faxed to [facility] [phone]  Confirmed fax sent. Spoke to pt. Made aware of results & recommendations. Pt voiced understanding.   Pt states

## 2019-12-05 NOTE — TELEPHONE ENCOUNTER
Received diagnostic breast US, and diagnostic mammogram results from [facility] for [facility] [address] [address]. Results placed for Dr. Pandey Aus review. Results also available in [facility] under [facility].

## 2019-12-06 NOTE — TELEPHONE ENCOUNTER
Noted results of diagnostic left brest mammo and ultrasound. Recommendation is a 1 year screening mammo.    They also recommend that given her breast density, screening automated breast us should be considered as a supplement to her annual mammogram.   We c

## 2019-12-09 NOTE — TELEPHONE ENCOUNTER
Called and left detailed message with information from Dr. Fraire Rose note below. Advised patient in message if she has any questions or concerns to please call our office.

## 2019-12-11 ENCOUNTER — MED REC SCAN ONLY (OUTPATIENT)
Dept: INTERNAL MEDICINE CLINIC | Facility: CLINIC | Age: 64
End: 2019-12-11

## 2020-01-04 DIAGNOSIS — E78.00 HYPERCHOLESTEREMIA: ICD-10-CM

## 2020-01-06 RX ORDER — SIMVASTATIN 10 MG
10 TABLET ORAL
Qty: 90 TABLET | Refills: 0 | Status: SHIPPED | OUTPATIENT
Start: 2020-01-06 | End: 2020-06-09

## 2020-03-12 ENCOUNTER — TELEPHONE (OUTPATIENT)
Dept: INTERNAL MEDICINE CLINIC | Facility: CLINIC | Age: 65
End: 2020-03-12

## 2020-03-12 DIAGNOSIS — S30.860A TICK BITE OF BACK, INITIAL ENCOUNTER: Primary | ICD-10-CM
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
## 2020-03-12 RX ORDER — DOXYCYCLINE HYCLATE 100 MG
200 TABLET ORAL ONCE
Qty: 2 TABLET | Refills: 0 | Status: SHIPPED | OUTPATIENT
Start: 2020-03-12 | End: 2020-03-12

## 2020-03-12 NOTE — TELEPHONE ENCOUNTER
Called and spoke with staff at [facility], who confirmed that correct test for identifying tick is what is pended. Order signed per Dr. Marie Henning note below. Also advised that tick must be dead, in a plastic, screw-cap container, and in 70% ethanol.      Called and

## 2020-03-12 NOTE — TELEPHONE ENCOUNTER
Patient discovered she was bitten by a tick on her back over the weekend. The tick was removed and she has it if needed. She is wondering what to look for and how to treat it. She prefers not to come in the office with all of the sickness going around.

## 2020-03-12 NOTE — TELEPHONE ENCOUNTER
Noted below. Does she still have the tick? We can try to see if we can send it for analysis. I pended an order for [facility]. Please confirm with [facility] if this is the correct order and how pt should transport the tick to them if she still has it.    Would rec

## 2020-03-12 NOTE — TELEPHONE ENCOUNTER
Called and spoke with patient. Patient reports she [place] \"[place] the woods\" [place] and noticed the tick this morning. Patient reports using rubbing alcohol over the tick, then removing the tick, intact, with tweezers.  Patient reports being able to see the h

## 2020-03-13 NOTE — TELEPHONE ENCOUNTER
Noted below. Thanks. Please see my prior note. Was she informed about taking doxycycline and was this ordered?

## 2020-03-16 NOTE — TELEPHONE ENCOUNTER
Yes, discussed doxycycline and ordered to preferred pharmacy in telephone conversation regarding preserving tick. Patient verbalized understanding of all.

## 2020-03-26 LAB — Lab: NOT DETECTED

## 2020-06-09 ENCOUNTER — OFFICE VISIT (OUTPATIENT)
Dept: INTERNAL MEDICINE CLINIC | Facility: CLINIC | Age: 65
End: 2020-06-09
Payer: MEDICARE

## 2020-06-09 VITALS
RESPIRATION RATE: 12 BRPM | BODY MASS INDEX: 28.09 KG/M2 | DIASTOLIC BLOOD PRESSURE: 68 MMHG | TEMPERATURE: 99 F | HEIGHT: 63.5 IN | HEART RATE: 64 BPM | WEIGHT: 160.5 LBS | SYSTOLIC BLOOD PRESSURE: 106 MMHG

## 2020-06-09 DIAGNOSIS — M85.80 OSTEOPENIA, UNSPECIFIED LOCATION: ICD-10-CM

## 2020-06-09 DIAGNOSIS — Z23 NEED FOR VACCINATION: ICD-10-CM

## 2020-06-09 DIAGNOSIS — Z00.00 ENCOUNTER FOR ANNUAL HEALTH EXAMINATION: ICD-10-CM

## 2020-06-09 DIAGNOSIS — Z12.31 VISIT FOR SCREENING MAMMOGRAM: ICD-10-CM

## 2020-06-09 DIAGNOSIS — E78.00 HYPERCHOLESTEREMIA: ICD-10-CM

## 2020-06-09 DIAGNOSIS — Z11.59 NEED FOR HEPATITIS C SCREENING TEST: ICD-10-CM

## 2020-06-09 DIAGNOSIS — Z78.0 POSTMENOPAUSAL: ICD-10-CM

## 2020-06-09 PROCEDURE — 99213 OFFICE O/P EST LOW 20 MIN: CPT | Performed by: INTERNAL MEDICINE

## 2020-06-09 PROCEDURE — G0402 INITIAL PREVENTIVE EXAM: HCPCS | Performed by: INTERNAL MEDICINE

## 2020-06-09 PROCEDURE — 90670 PCV13 VACCINE IM: CPT | Performed by: INTERNAL MEDICINE

## 2020-06-09 PROCEDURE — G0403 EKG FOR INITIAL PREVENT EXAM: HCPCS | Performed by: INTERNAL MEDICINE

## 2020-06-09 PROCEDURE — G0009 ADMIN PNEUMOCOCCAL VACCINE: HCPCS | Performed by: INTERNAL MEDICINE

## 2020-06-09 RX ORDER — SIMVASTATIN 10 MG
10 TABLET ORAL
Qty: 90 TABLET | Refills: 3 | Status: SHIPPED | OUTPATIENT
Start: 2020-06-09 | End: 2021-07-07

## 2020-06-09 NOTE — PROGRESS NOTES
HPI:   Migdalia Ayala is a 72year old female who presents for a Medicare Initial Preventative Physical Exam (Welcome to Medicare- < 12 months on Medicare). Hyperlipidemia: on statin. No muscle aches. Osteopenia: last dexa done 2019.  She is mark Care Team: Patient Care Team:  Jody Merino MD as PCP - Eva Walton MD as Consulting Physician ([facility])  Bell Hoang MD as Consulting Physician ([facility])  Noa Zelaya MD ([address] [place] [address])  MARIA L Luna (2/1/12), Seborrheic keratosis (5/14/12), Shingles (5/15), and Verruca vulgaris (5/14/12).     She  has a past surgical history that includes femur/knee surg unlisted (2005); d & c (2008); colonoscopy,biopsy (4/27/12); colonoscopy (11/5/15); and skin surger rhythm  Abdomen: soft, non-tender; bowel sounds normal; no masses,  no organomegaly  Extremities: extremities normal, atraumatic, no cyanosis or edema    Vaccination History     Immunization History   Administered Date(s) Administered   • Flucelvax 0.5 Ml in about 1 year (around 6/9/2021) for annual wellness visit, med check.      Mauro Espinoza MD, 6/9/2020     General Health     In the past six months, have you lost more than 10 pounds without trying?: (P) 2 - No  Has your appetite been poor?: (P) No  How do if high risk No results found for: CHLAMYDIA No flowsheet data found.     Screening Mammogram      Mammogram Annually to 76, then as discussed Mammogram due on 11/26/2020 Update Health Maintenance if applicable     Immunizations (Update Immunization Activit

## 2020-06-09 NOTE — PATIENT INSTRUCTIONS
Diana Licona's SCREENING SCHEDULE   Tests on this list are recommended by your physician but may not be covered, or covered at this frequency, by your insurer. Please check with your insurance carrier before scheduling to verify coverage.    OSMEL years- more often if abnormal Colonoscopy due on 11/05/2025 Update [facility] if applicable    Flex Sigmoidoscopy Screen  Covered every 5 years No results found for this or any previous visit. No flowsheet data found.      Fecal Occult Blood   Cover previous visit. Please get once after your 65th birthday    Hepatitis B for Moderate/High Risk       No orders found for this or any previous visit.  Medium/high risk factors:   End-stage renal disease   Hemophiliacs who received Factor VIII or IX concentra

## 2021-02-02 ENCOUNTER — IMMUNIZATION (OUTPATIENT)
Dept: LAB | Age: 66
End: 2021-02-02

## 2021-02-02 DIAGNOSIS — Z23 NEED FOR VACCINATION: Primary | ICD-10-CM

## 2021-02-02 PROCEDURE — 91300 COVID 19 PFIZER-BIONTECH: CPT

## 2021-02-02 PROCEDURE — 0001A COVID 19 PFIZER-BIONTECH: CPT

## 2021-02-23 ENCOUNTER — IMMUNIZATION (OUTPATIENT)
Dept: LAB | Age: 66
End: 2021-02-23

## 2021-02-23 DIAGNOSIS — Z23 NEED FOR VACCINATION: Primary | ICD-10-CM

## 2021-02-23 PROCEDURE — 91300 COVID 19 PFIZER-BIONTECH: CPT | Performed by: NURSE PRACTITIONER

## 2021-02-23 PROCEDURE — 0002A COVID 19 PFIZER-BIONTECH: CPT | Performed by: NURSE PRACTITIONER

## 2021-07-07 ENCOUNTER — OFFICE VISIT (OUTPATIENT)
Dept: INTERNAL MEDICINE CLINIC | Facility: CLINIC | Age: 66
End: 2021-07-07
Payer: MEDICARE

## 2021-07-07 VITALS
BODY MASS INDEX: 26.09 KG/M2 | DIASTOLIC BLOOD PRESSURE: 72 MMHG | RESPIRATION RATE: 14 BRPM | HEART RATE: 73 BPM | OXYGEN SATURATION: 97 % | TEMPERATURE: 98 F | SYSTOLIC BLOOD PRESSURE: 114 MMHG | WEIGHT: 149.13 LBS | HEIGHT: 63.31 IN

## 2021-07-07 DIAGNOSIS — M85.80 OSTEOPENIA, UNSPECIFIED LOCATION: ICD-10-CM

## 2021-07-07 DIAGNOSIS — E78.00 HYPERCHOLESTEREMIA: ICD-10-CM

## 2021-07-07 DIAGNOSIS — Z00.00 ENCOUNTER FOR ANNUAL HEALTH EXAMINATION: ICD-10-CM

## 2021-07-07 DIAGNOSIS — Z78.0 POSTMENOPAUSAL: ICD-10-CM

## 2021-07-07 DIAGNOSIS — Z12.31 BREAST CANCER SCREENING BY MAMMOGRAM: ICD-10-CM

## 2021-07-07 DIAGNOSIS — Z23 NEED FOR VACCINATION: ICD-10-CM

## 2021-07-07 PROCEDURE — G0009 ADMIN PNEUMOCOCCAL VACCINE: HCPCS | Performed by: INTERNAL MEDICINE

## 2021-07-07 PROCEDURE — 90732 PPSV23 VACC 2 YRS+ SUBQ/IM: CPT | Performed by: INTERNAL MEDICINE

## 2021-07-07 PROCEDURE — G0438 PPPS, INITIAL VISIT: HCPCS | Performed by: INTERNAL MEDICINE

## 2021-07-07 PROCEDURE — 99214 OFFICE O/P EST MOD 30 MIN: CPT | Performed by: INTERNAL MEDICINE

## 2021-07-07 RX ORDER — SIMVASTATIN 10 MG
10 TABLET ORAL
Qty: 90 TABLET | Refills: 3 | Status: SHIPPED | OUTPATIENT
Start: 2021-07-07

## 2021-07-07 RX ORDER — PHENOL 1.4 %
1 AEROSOL, SPRAY (ML) MUCOUS MEMBRANE DAILY
COMMUNITY

## 2021-07-07 NOTE — PROGRESS NOTES
HPI:   Migdalia Ayala is a 77year old female who presents for a med check and Medicare Initial Annual Wellness visit (Once after 12 month Medicare anniversary) . Hyperlipidemia: on statin. No muscle aches.       Osteopenia: taking calcium and vit d bridges months ago.   Social History    Tobacco Use      Smoking status: Former Smoker        Packs/day: 0.50        Years: 2.00        Pack years: 1        Quit date: 1985        Years since quittin.2      Smokeless tobacco: Never Used         CAGE scree daily as needed.        MEDICAL INFORMATION:   She  has a past medical history of Colon polyps (4/27/12), Eczema, Hot flashes (2/1/12), Hypercholesteremia (2/8/11), Hyperlipidemia, Internal hemorrhoids (6/64/47), Lichen simplex chronicus (5/14/12), Lipid sc (Required for AWV/SWV)    Finger Rub        Visual Acuity                           General appearance: alert, appears stated age and cooperative  Lungs: clear to auscultation bilaterally  Heart: S1, S2 normal, no murmur, click, rub or gallop, regular rate depression. Seeing eye doctor yearly. Breast cancer screening by mammogram  -     [facility] MARY 2D+3D SCREENING BILAT (CPT=77067/25633); Future    Hypercholesteremia  -     simvastatin 10 MG Oral Tab; Take 1 tablet (10 mg total) by mouth once daily.   - Panel  Cholesterol  Lipoprotein (HDL)  Triglycerides Covered every 5 years for all Medicare beneficiaries without apparent signs or symptoms of cardiovascular disease Lab Results   Component Value Date    CHOLEST 193 06/26/2020    HDL 54 06/26/2020    LDL 06/09/2020    [id number]: -     Pneumococcal Vaccination(2 of 2 - PPSV23) due on 06/09/2021    Hepatitis B One screening covered for patients with certain risk factors   -  No recommendations at this time    Tetanus Toxoid Not covered by Medicare Part B u

## 2021-07-07 NOTE — PATIENT INSTRUCTIONS
Arden Licona's SCREENING SCHEDULE   Tests on this list are recommended by your physician but may not be covered, or covered at this frequency, by your insurer. Please check with your insurance carrier before scheduling to verify coverage.    PREVENT Scan Never done   Pap and Pelvic    Pap   Covered every 2 years for women at normal risk;  Annually if at high risk -  No recommendations at this time    Chlamydia Annually if high risk -  No recommendations at this time   Screening Mammogram    Mammogram regarding Advance Directives.

## 2021-07-28 LAB
ALBUMIN/GLOBULIN RATIO: 1.5 (CALC) (ref 1–2.5)
ALBUMIN: 4 G/DL (ref 3.6–5.1)
ALKALINE PHOSPHATASE: 75 U/L (ref 37–153)
ALT: 23 U/L (ref 6–29)
AST: 20 U/L (ref 10–35)
BILIRUBIN, TOTAL: 0.4 MG/DL (ref 0.2–1.2)
BUN: 15 MG/DL (ref 7–25)
CALCIUM: 9.2 MG/DL (ref 8.6–10.4)
CARBON DIOXIDE: 29 MMOL/L (ref 20–32)
CHLORIDE: 103 MMOL/L (ref 98–110)
CHOL/HDLC RATIO: 3.6 (CALC)
CHOLESTEROL, TOTAL: 178 MG/DL
CREATININE: 0.86 MG/DL (ref 0.5–0.99)
EGFR IF AFRICN AM: 82 ML/MIN/1.73M2
EGFR IF NONAFRICN AM: 70 ML/MIN/1.73M2
GLOBULIN: 2.7 G/DL (CALC) (ref 1.9–3.7)
GLUCOSE: 90 MG/DL (ref 65–99)
HDL CHOLESTEROL: 50 MG/DL
LDL-CHOLESTEROL: 108 MG/DL (CALC)
NON-HDL CHOLESTEROL: 128 MG/DL (CALC)
POTASSIUM: 4 MMOL/L (ref 3.5–5.3)
PROTEIN, TOTAL: 6.7 G/DL (ref 6.1–8.1)
SODIUM: 138 MMOL/L (ref 135–146)
TRIGLYCERIDES: 102 MG/DL
TSH W/REFLEX TO FT4: 2.02 MIU/L (ref 0.4–4.5)

## 2021-10-27 ENCOUNTER — TELEPHONE (OUTPATIENT)
Dept: INTERNAL MEDICINE CLINIC | Facility: CLINIC | Age: 66
End: 2021-10-27

## 2022-06-22 ENCOUNTER — TELEPHONE (OUTPATIENT)
Dept: INTERNAL MEDICINE CLINIC | Facility: CLINIC | Age: 67
End: 2022-06-22

## 2022-06-22 NOTE — TELEPHONE ENCOUNTER
Per patient request, faxed orders for mammogram and dexascan dated 7/7/21 to [facility] for [facility] [address] at [phone].   Confirmation received

## 2022-07-05 ENCOUNTER — TELEPHONE (OUTPATIENT)
Dept: INTERNAL MEDICINE CLINIC | Facility: CLINIC | Age: 67
End: 2022-07-05

## 2022-07-05 NOTE — TELEPHONE ENCOUNTER
Received mammogram results from [facility]   This is in care everywhere   HM updated   To Arun Lynn for review

## 2022-07-05 NOTE — TELEPHONE ENCOUNTER
Incoming (mail or fax):  fax  Received from:  [facility]i Ball[facility]   Documentation given to:  Results bin    mammogram

## 2022-07-05 NOTE — TELEPHONE ENCOUNTER
Received dexa scan results completed at [facility]  This is in care everywhere   To [facility] for review

## 2022-07-05 NOTE — TELEPHONE ENCOUNTER
Incoming (mail or fax):  fax  Received from:  Simi Ballard   Documentation given to:  Results bin     Dexa scan

## 2022-07-06 NOTE — TELEPHONE ENCOUNTER
Informed pt of results and recommendations   She verbalized understanding and had no additional questions

## 2022-07-06 NOTE — TELEPHONE ENCOUNTER
Informed pt of results and recommendations   She verbalized understanding and had no additional questions Pharmacy Note  Vancomycin Consult    Keyshawn Luo is a 40 

## 2022-07-06 NOTE — TELEPHONE ENCOUNTER
Is a scale with osteopenia. Do calcium in diet, calcium tablet that she is taking, weightbearing exercise.   Thank you

## 2022-07-08 ENCOUNTER — MED REC SCAN ONLY (OUTPATIENT)
Dept: INTERNAL MEDICINE CLINIC | Facility: CLINIC | Age: 67
End: 2022-07-08

## 2022-07-19 ENCOUNTER — OFFICE VISIT (OUTPATIENT)
Dept: INTERNAL MEDICINE CLINIC | Facility: CLINIC | Age: 67
End: 2022-07-19
Payer: MEDICARE

## 2022-07-19 VITALS
WEIGHT: 155.31 LBS | RESPIRATION RATE: 12 BRPM | DIASTOLIC BLOOD PRESSURE: 70 MMHG | HEIGHT: 63.5 IN | SYSTOLIC BLOOD PRESSURE: 112 MMHG | HEART RATE: 64 BPM | TEMPERATURE: 98 F | BODY MASS INDEX: 27.18 KG/M2

## 2022-07-19 DIAGNOSIS — Z00.00 ENCOUNTER FOR ANNUAL HEALTH EXAMINATION: Primary | ICD-10-CM

## 2022-07-19 DIAGNOSIS — Z12.11 SCREENING FOR COLON CANCER: ICD-10-CM

## 2022-07-19 DIAGNOSIS — Z12.31 ENCOUNTER FOR SCREENING MAMMOGRAM FOR BREAST CANCER: ICD-10-CM

## 2022-07-19 DIAGNOSIS — R53.83 FATIGUE, UNSPECIFIED TYPE: ICD-10-CM

## 2022-07-19 DIAGNOSIS — E78.00 HYPERCHOLESTEREMIA: ICD-10-CM

## 2022-07-19 DIAGNOSIS — M85.80 OSTEOPENIA, UNSPECIFIED LOCATION: ICD-10-CM

## 2022-07-19 PROCEDURE — 99214 OFFICE O/P EST MOD 30 MIN: CPT | Performed by: INTERNAL MEDICINE

## 2022-07-19 PROCEDURE — 1126F AMNT PAIN NOTED NONE PRSNT: CPT | Performed by: INTERNAL MEDICINE

## 2022-07-19 PROCEDURE — G0439 PPPS, SUBSEQ VISIT: HCPCS | Performed by: INTERNAL MEDICINE

## 2022-07-19 RX ORDER — SIMVASTATIN 10 MG
10 TABLET ORAL
Qty: 90 TABLET | Refills: 3 | Status: SHIPPED | OUTPATIENT
Start: 2022-07-19

## 2022-07-22 LAB
ABSOLUTE BASOPHILS: 33 CELLS/UL (ref 0–200)
ABSOLUTE EOSINOPHILS: 182 CELLS/UL (ref 15–500)
ABSOLUTE LYMPHOCYTES: 1658 CELLS/UL (ref 850–3900)
ABSOLUTE MONOCYTES: 475 CELLS/UL (ref 200–950)
ABSOLUTE NEUTROPHILS: 4154 CELLS/UL (ref 1500–7800)
ALBUMIN/GLOBULIN RATIO: 1.4 (CALC) (ref 1–2.5)
ALBUMIN: 4 G/DL (ref 3.6–5.1)
ALKALINE PHOSPHATASE: 70 U/L (ref 37–153)
ALT: 17 U/L (ref 6–29)
AST: 15 U/L (ref 10–35)
BASOPHILS: 0.5 %
BILIRUBIN, TOTAL: 0.4 MG/DL (ref 0.2–1.2)
BUN: 15 MG/DL (ref 7–25)
CALCIUM: 9.1 MG/DL (ref 8.6–10.4)
CARBON DIOXIDE: 27 MMOL/L (ref 20–32)
CHLORIDE: 105 MMOL/L (ref 98–110)
CHOL/HDLC RATIO: 3.5 (CALC)
CHOLESTEROL, TOTAL: 212 MG/DL
CREATININE: 0.79 MG/DL (ref 0.5–1.05)
EGFR: 82 ML/MIN/1.73M2
EOSINOPHILS: 2.8 %
GLOBULIN: 2.8 G/DL (CALC) (ref 1.9–3.7)
GLUCOSE: 85 MG/DL (ref 65–99)
HDL CHOLESTEROL: 60 MG/DL
HEMATOCRIT: 40.6 % (ref 35–45)
HEMOGLOBIN: 13.5 G/DL (ref 11.7–15.5)
LDL-CHOLESTEROL: 129 MG/DL (CALC)
LYMPHOCYTES: 25.5 %
MCH: 30 PG (ref 27–33)
MCHC: 33.3 G/DL (ref 32–36)
MCV: 90.2 FL (ref 80–100)
MONOCYTES: 7.3 %
MPV: 10.7 FL (ref 7.5–12.5)
NEUTROPHILS: 63.9 %
NON-HDL CHOLESTEROL: 152 MG/DL (CALC)
PLATELET COUNT: 242 THOUSAND/UL (ref 140–400)
POTASSIUM: 4.6 MMOL/L (ref 3.5–5.3)
PROTEIN, TOTAL: 6.8 G/DL (ref 6.1–8.1)
RDW: 12.4 % (ref 11–15)
RED BLOOD CELL COUNT: 4.5 MILLION/UL (ref 3.8–5.1)
SODIUM: 139 MMOL/L (ref 135–146)
TRIGLYCERIDES: 118 MG/DL
TSH W/REFLEX TO FT4: 1.96 MIU/L (ref 0.4–4.5)
VITAMIN D, 25-OH, TOTAL: 40 NG/ML (ref 30–100)
WHITE BLOOD CELL COUNT: 6.5 THOUSAND/UL (ref 3.8–10.8)

## 2022-11-17 PROBLEM — Z86.0101 HISTORY OF ADENOMATOUS POLYP OF COLON: Status: ACTIVE | Noted: 2022-11-17

## 2022-11-17 PROBLEM — Z86.010 HISTORY OF ADENOMATOUS POLYP OF COLON: Status: ACTIVE | Noted: 2022-11-17

## 2022-11-17 PROBLEM — Z80.0 FAMILY HISTORY OF COLON CANCER: Status: ACTIVE | Noted: 2022-11-17

## 2022-11-30 ENCOUNTER — TELEMEDICINE (OUTPATIENT)
Dept: INTERNAL MEDICINE CLINIC | Facility: CLINIC | Age: 67
End: 2022-11-30
Payer: MEDICARE

## 2022-11-30 DIAGNOSIS — U07.1 COVID-19 VIRUS INFECTION: Primary | ICD-10-CM

## 2022-11-30 PROCEDURE — 1126F AMNT PAIN NOTED NONE PRSNT: CPT | Performed by: NURSE PRACTITIONER

## 2022-11-30 PROCEDURE — 99213 OFFICE O/P EST LOW 20 MIN: CPT | Performed by: NURSE PRACTITIONER

## 2022-11-30 RX ORDER — GUAIFENESIN AND DEXTROMETHORPHAN HYDROBROMIDE 600; 30 MG/1; MG/1
1 TABLET, EXTENDED RELEASE ORAL EVERY 12 HOURS
COMMUNITY

## 2022-11-30 NOTE — PATIENT INSTRUCTIONS
Do warm, salt water gargles 2-3 times daily. You can use Robitussin DM or Mucinex DM as directed on the bottle for cough and chest congestion. Use Cepacol for sore throat and dry cough as needed. Use Tylenol as needed for pain or fever. Stay hydrated. Go to ER or call 911 if worsening symptoms such as persistent fever >103, difficulty breathing, or chest pain. Return to clinic in one week as needed or when routine care is due. Infection prevention:  - Proper hand hygiene is very important          - Wear a mask in public  - Avoid touching your mouth, nose, eyes, and face  - Practice social distancing and staying 6 ft away from other individuals  - Cough into your arm or a tissue  - Avoid contact with others if you have symptoms of an upper or lower respiratory infection  - Avoid contact with others who are ill  - Avoid direct contact with your pets if you are ill  - Disinfect surfaces with appropriate materials  - If your symptoms become severe (shortness of breath with rest or activity, fever, chest congestion, productive cough), go to the ER  - Recommend self-isolation at home if you are sick, wearing a mask if in room with other family members    CDC Update: Isolation & Quarantine Guidelines for Patients - Updated 12/29/2021  Patients who are healthcare workers, follow your organization policy. If You Test Positive for COVID-19 (Isolate)    Everyone regardless of vaccination status:   Stay home for 5 days  If you have no symptoms or your symptoms are resolving after 5 days, you can leave your house, return to work, travel, etc.  Continue to wear a mask around others for 5 additional days. If you have a fever, continue to stay home until your fever resolves.       If You Were Exposed to Someone with COVID-19 (Quarantine)    If you:               Have been boosted  OR  Completed the primary series of Pfizer or Moderna vaccine within the last 6 months  OR  Completed the primary series of J&J vaccine within the last 2 months    Wear a mask around others for 10 days. Test on day 5, if possible. If you develop symptoms get a test and stay home. If you:  Completed the primary series of Pfizer or Moderna vaccine over 6 months ago and are not boosted  OR  Completed the primary series of J&J over 2 months ago and are not boosted  OR  Are unvaccinated    Stay home for 5 days. After that continue to wear a mask around others for 5 additional days. If you can't quarantine you must wear a mask for 10 days. Test on day 5 if possible. If you develop symptoms get a test and stay home.     cdc.gov/coronavirus    10 THINGS YOU CAN DO TO MANAGE YOUR COVID-19 SYMPTOMS AT HOME    If you have possible or confirmed COVID-19    Stay home except to get medical care. Monitor your symptoms carefully. If your symptoms get worse, call your healthcare provider immediately. Get rest and stay hydrated. If you have a medical appointment, call the healthcare provider ahead of time and tell them you have or may have COVID-19. For medical emergencies, call 911 and notify the dispatch personnel that you have or may have COVID-19. Cover your cough and sneezes with a tissue or use the inside of your elbow. Wash your hands often with soap and water for at least 20 seconds or clean hands with an alcohol-based hand  that contains at least 60% alcohol. As much as possible, stay in a specific room and away from other people in your home. Also, you should use a separate bathroom, if available. If you need to be around other people in or outside of the home, wear a mask. Avoid sharing personal items with other people in your household, like dishes, towels, and bedding. Clean all surfaces that are touched often, like counters, tabletops, and doorknobs. Use household cleaning sprays or wipes according to the label instructions.

## 2023-07-12 ENCOUNTER — TELEPHONE (OUTPATIENT)
Dept: INTERNAL MEDICINE CLINIC | Facility: CLINIC | Age: 68
End: 2023-07-12

## 2023-07-12 NOTE — TELEPHONE ENCOUNTER
Noted. See note re dense breasts and recommendation for breast us. Please inform pt and if interested can order bilateral breast us. Also inform her about the need for additional views, has she already scheduled this?

## 2023-07-12 NOTE — TELEPHONE ENCOUNTER
Received mammogram results from [facility] ordered by . Mammogram shows an indeterminate right breast mass. Recommendation is for diagnostic mammogram and right ultrasound, they will call the patient directly for additional imaging. In 's bin for review, thank you.

## 2023-07-12 NOTE — TELEPHONE ENCOUNTER
TEST RESULTS    Incoming (mail or fax):   Fax  Received from:  [facility]  Documentation given to:  Triage

## 2023-07-20 ENCOUNTER — TELEPHONE (OUTPATIENT)
Dept: INTERNAL MEDICINE CLINIC | Facility: CLINIC | Age: 68
End: 2023-07-20

## 2023-07-20 NOTE — TELEPHONE ENCOUNTER
Order for breast biopsy signed by Dr. Tim Pena and faxed to [phone]. Fax confirmed. Document to scan.

## 2023-07-24 ENCOUNTER — TELEPHONE (OUTPATIENT)
Dept: INTERNAL MEDICINE CLINIC | Facility: CLINIC | Age: 68
End: 2023-07-24

## 2023-07-24 NOTE — TELEPHONE ENCOUNTER
Pt would like to know if provider thinks biopsy is neccessary. Pt had second mammogram on 7/20/23.  Biopsy is scheduled for 7/28/23

## 2023-07-24 NOTE — TELEPHONE ENCOUNTER
Pt had diagnostic mammo and US completed 7/20, in [facility] [address] Recommendation for biopsy of right breast. Please advise, thank you!

## 2023-08-01 ENCOUNTER — TELEPHONE (OUTPATIENT)
Dept: INTERNAL MEDICINE CLINIC | Facility: CLINIC | Age: 68
End: 2023-08-01

## 2023-08-01 DIAGNOSIS — Z12.31 ENCOUNTER FOR SCREENING MAMMOGRAM FOR BREAST CANCER: Primary | ICD-10-CM

## 2023-08-01 NOTE — TELEPHONE ENCOUNTER
Incoming (mail or fax):  fax  Received from:  [place]  Documentation given to:  surgery pathology report

## 2023-08-01 NOTE — TELEPHONE ENCOUNTER
Incoming (mail or fax):  fax  Received from:  [facility] advanced imaging  Documentation given to:   In Vini Morley for signature    Future orders for 2024 mammo/breast screening

## 2023-08-01 NOTE — TELEPHONE ENCOUNTER
Reviewed notes. Per note biopsy benign. She should return to annual screening mammogram which will be due in 7/2024. Please order. Also they are recommending a bilateral breast ultrasound for dense breasts. Note states July 2023 but order states July 2024. Please clarify recommendation, is this to be done in July 2024? Order signed.

## 2023-08-01 NOTE — TELEPHONE ENCOUNTER
Spoke with MARGARITA- they advised that [address] and mammo are both due July 2024. Signed order faxed to [facility], fax confirmed and sent for scanning. [facility] will not allow me to sign the order for a year in advance as the expiration date is too far out. . can this be ordered at a later date? Thanks!

## 2023-08-04 ENCOUNTER — MED REC SCAN ONLY (OUTPATIENT)
Dept: INTERNAL MEDICINE CLINIC | Facility: CLINIC | Age: 68
End: 2023-08-04

## 2023-08-10 ENCOUNTER — TELEPHONE (OUTPATIENT)
Dept: INTERNAL MEDICINE CLINIC | Facility: CLINIC | Age: 68
End: 2023-08-10

## 2023-08-10 NOTE — TELEPHONE ENCOUNTER
Incoming (mail or fax):  fax  Received from:  [facility]  Documentation given to:  Triage incoming    Breast US needle biopsy 1st lesion

## 2023-08-10 NOTE — TELEPHONE ENCOUNTER
Incoming (mail or fax):  fax  Received from:  [facility]  Documentation given to:  Triage incoming    Diagnostic mammo post procedure

## 2023-08-11 NOTE — TELEPHONE ENCOUNTER
Reviewed. We had already received the report of the biopsy before. Due for mammo and bilateral breast us in 7/2024.

## 2023-08-11 NOTE — TELEPHONE ENCOUNTER
Reviewed. Sent for scanning. We had already received her biopsy report before, it was benign. Due for mammo and bilateral breast us in 7/2024. HM not updated from before. Please update.

## 2023-09-05 ENCOUNTER — OFFICE VISIT (OUTPATIENT)
Dept: INTERNAL MEDICINE CLINIC | Facility: CLINIC | Age: 68
End: 2023-09-05
Payer: MEDICARE

## 2023-09-05 VITALS
TEMPERATURE: 98 F | DIASTOLIC BLOOD PRESSURE: 62 MMHG | BODY MASS INDEX: 28.06 KG/M2 | SYSTOLIC BLOOD PRESSURE: 106 MMHG | WEIGHT: 158.38 LBS | RESPIRATION RATE: 12 BRPM | HEIGHT: 63 IN | HEART RATE: 76 BPM

## 2023-09-05 DIAGNOSIS — R92.8 ABNORMAL MAMMOGRAM: ICD-10-CM

## 2023-09-05 DIAGNOSIS — R92.2 DENSE BREASTS: ICD-10-CM

## 2023-09-05 DIAGNOSIS — E78.00 HYPERCHOLESTEREMIA: ICD-10-CM

## 2023-09-05 DIAGNOSIS — Z00.00 ENCOUNTER FOR ANNUAL HEALTH EXAMINATION: Primary | ICD-10-CM

## 2023-09-05 DIAGNOSIS — Z12.31 ENCOUNTER FOR SCREENING MAMMOGRAM FOR MALIGNANT NEOPLASM OF BREAST: ICD-10-CM

## 2023-09-05 DIAGNOSIS — M85.80 OSTEOPENIA, UNSPECIFIED LOCATION: ICD-10-CM

## 2023-09-05 PROCEDURE — 1126F AMNT PAIN NOTED NONE PRSNT: CPT | Performed by: INTERNAL MEDICINE

## 2023-09-05 PROCEDURE — 99214 OFFICE O/P EST MOD 30 MIN: CPT | Performed by: INTERNAL MEDICINE

## 2023-09-05 PROCEDURE — G0439 PPPS, SUBSEQ VISIT: HCPCS | Performed by: INTERNAL MEDICINE

## 2023-09-05 RX ORDER — DESONIDE 0.5 MG/G
CREAM TOPICAL
COMMUNITY
Start: 2023-08-21

## 2023-09-30 LAB
ALBUMIN/GLOBULIN RATIO: 1.4 (CALC) (ref 1–2.5)
ALBUMIN: 4.1 G/DL (ref 3.6–5.1)
ALKALINE PHOSPHATASE: 70 U/L (ref 37–153)
ALT: 17 U/L (ref 6–29)
AST: 17 U/L (ref 10–35)
BILIRUBIN, TOTAL: 0.4 MG/DL (ref 0.2–1.2)
BUN: 12 MG/DL (ref 7–25)
CALCIUM: 9 MG/DL (ref 8.6–10.4)
CARBON DIOXIDE: 30 MMOL/L (ref 20–32)
CHLORIDE: 104 MMOL/L (ref 98–110)
CHOL/HDLC RATIO: 3.5 (CALC)
CHOLESTEROL, TOTAL: 205 MG/DL
CREATININE: 0.86 MG/DL (ref 0.5–1.05)
EGFR: 74 ML/MIN/1.73M2
GLOBULIN: 2.9 G/DL (CALC) (ref 1.9–3.7)
GLUCOSE: 81 MG/DL (ref 65–99)
HDL CHOLESTEROL: 58 MG/DL
LDL-CHOLESTEROL: 128 MG/DL (CALC)
NON-HDL CHOLESTEROL: 147 MG/DL (CALC)
POTASSIUM: 4.2 MMOL/L (ref 3.5–5.3)
PROTEIN, TOTAL: 7 G/DL (ref 6.1–8.1)
SODIUM: 139 MMOL/L (ref 135–146)
TRIGLYCERIDES: 87 MG/DL
TSH W/REFLEX TO FT4: 2.25 MIU/L (ref 0.4–4.5)

## 2024-01-22 DIAGNOSIS — E78.00 HYPERCHOLESTEREMIA: ICD-10-CM

## 2024-01-22 RX ORDER — SIMVASTATIN 10 MG
10 TABLET ORAL
Qty: 90 TABLET | Refills: 0 | Status: SHIPPED | OUTPATIENT
Start: 2024-01-22

## 2024-04-19 LAB
AMB EXT HEMATOCRIT: 40.6
AMB EXT HEMOGLOBIN: 13.3
AMB EXT MCV: 90
AMB EXT PLATELETS: 254
AMB EXT TSH: 2.93 MIU/ML
AMB EXT WBC: 5.3 X10(3)UL

## 2024-04-20 DIAGNOSIS — E78.00 HYPERCHOLESTEREMIA: ICD-10-CM

## 2024-04-23 ENCOUNTER — TELEPHONE (OUTPATIENT)
Dept: INTERNAL MEDICINE CLINIC | Facility: CLINIC | Age: 69
End: 2024-04-23

## 2024-04-23 RX ORDER — SIMVASTATIN 10 MG
10 TABLET ORAL
Qty: 90 TABLET | Refills: 1 | Status: SHIPPED | OUTPATIENT
Start: 2024-04-23

## 2024-04-23 NOTE — TELEPHONE ENCOUNTER
Cholesterol Medication Protocol [id number]/20/2024 01:31 AM   Protocol Details ALT < 80    ALT resulted within past year    Lipid panel within past 12 months    In person appointment or virtual visit in the past 12 mos or appointment in next 3 mos      2. Hypercholesteremia  Continue statin.   No future appointments.

## 2024-04-23 NOTE — TELEPHONE ENCOUNTER
Incoming (mail or fax):  fax  Received from:  [facility]  Documentation given to:  Triage results    Lab results

## 2024-04-23 NOTE — TELEPHONE ENCOUNTER
TSH and T4, CBC and iron panels ordered and reviewed by derm. In external results. In 's bin, thanks!

## 2024-07-18 ENCOUNTER — TELEPHONE (OUTPATIENT)
Dept: INTERNAL MEDICINE CLINIC | Facility: CLINIC | Age: 69
End: 2024-07-18

## 2024-07-19 NOTE — TELEPHONE ENCOUNTER
Mammo negative. Has dense breasts. Please give her information about dense breast and additional testing.

## 2024-10-08 ENCOUNTER — TELEPHONE (OUTPATIENT)
Dept: INTERNAL MEDICINE CLINIC | Facility: CLINIC | Age: 69
End: 2024-10-08

## 2024-10-08 NOTE — PROGRESS NOTES
Subjective:   Sobia Licona is a 69 year old female who presents for a Subsequent Annual Wellness visit (Pt already had Initial Annual Wellness) and med check.     Mammo done 7/2024 negative. Had dense breast advisory. Discussed with pt today. Not covered by insurance,she wants to hold off. Breast exam done today.   Declines pelvic. No symptoms.   Colonoscopy done 11/2022, repeat in 5 years. Had adenomas.   Dexa done 7/2022 showed osteopenia. Ordered.     Up to date tdap (done 4/2015).   Due shingrix #2. Had a reaction to shingrix #1 so holding off on shingrix #2. Had a rash.   Up to date prevnar 13 and pneumovax for now.   Got the covid booster.     AHA flowsheet reviewed.   No falls.   Memory testing normal.   Mood has been stable. No depression.   Seeing eye doctor yearly.      Hyperlipidemia: on statin. No muscle aches.  Will need labs.      Osteopenia: taking calcium and vit d. Dexa due.     History/Other:   Fall Risk Assessment:   She has been screened for Falls and is low risk.      Cognitive Assessment:   She had a completely normal cognitive assessment - see flowsheet entries       Functional Ability/Status:   Sobia Licona has a completely normal functional assessment. See flowsheet for details.      Depression Screening (PHQ):  PHQ-2 SCORE: 0  , done 10/9/2024          Advanced Directives:   She does have a Living Will but we do NOT have it on file in Epic.    She does have a POA but we do NOT have it on file in Epic.    Discussed with patient and provided information.        Patient Active Problem List   Diagnosis    Internal hemorrhoids    Hypercholesteremia    Eczema    Osteopenia    Adhesive capsulitis of right shoulder    PAC (premature atrial contraction)    Family history of colon cancer     Allergies:  She is allergic to shingrix [zoster vac recomb adjuvanted].    Current Medications:  Outpatient Medications Marked as Taking for the 10/9/24 encounter (Office Visit) with Mayra Gay MD    Medication Sig    simvastatin 10 MG Oral Tab Take 1 tablet (10 mg total) by mouth once daily.    desonide 0.05 % External Cream apply to red areas on groin twice a day for 2 weeks then stop    Multiple Vitamins-Minerals (MULTIVITAMIN ADULTS 50+) Oral Tab Take 1 tablet by mouth daily.    CALCIUM OR Take 1 tablet by mouth daily.    Betamethasone Valerate 0.12 % External Foam NBA EXT TO THE SCALP QHS PRN       Medical History:  She  has a past medical history of Colon polyps (2012), Eczema, Heart palpitations, Hemorrhoids, High cholesterol, History of cardiac murmur, Hot flashes (2012), Hypercholesteremia (2011), Hyperlipidemia, Internal hemorrhoids (2012), Lichen simplex chronicus (2012), Lipid screening (2012), Meniscus tear (), Multiple benign nevi (2012), Osteopenia (2011), Routine general medical examination at a health care facility (2012), Seborrheic keratosis (2012), Shingles (2015), Verruca vulgaris (2012), and Wears glasses.  Surgical History:  She  has a past surgical history that includes femur/knee surg unlisted (); d & c (); colonoscopy,biopsy (2012); colonoscopy (2015); skin surgery (10/01/2018); colonoscopy (); ; other surgical history (Knee ); and skin surgery (2023).   Family History:  Her family history includes CAD,s/p CABG,hypercholesterolemia,valve replacement,macular degeneration in an other family member; Colon Cancer in her brother and brother; DM in her brother; Dementia in her mother; Dementia,glaucoma,htn in her mother; Diabetes in her father; Hypertension in her father; Kidney Disease in her father; Lipids in her father; MI, (age of onset: 51) in her paternal grandfather; Other in her father; brain tumor (age of onset: 12) in her sister; dyslipidemia in her brother.  Social History:  She  reports that she quit smoking about 39 years ago. Her smoking use included cigarettes. She  started smoking about 41 years ago. She has a 1 pack-year smoking history. She has been exposed to tobacco smoke. She has never used smokeless tobacco. She reports that she does not currently use alcohol after a past usage of about 1.0 standard drink of alcohol per week. She reports that she does not use drugs.    Tobacco:  She smoked tobacco in the past but quit greater than 12 months ago.  Social History     Tobacco Use   Smoking Status Former    Current packs/day: 0.00    Average packs/day: 0.5 packs/day for 2.0 years (1.0 ttl pk-yrs)    Types: Cigarettes    Start date: 1983    Quit date: 1985    Years since quittin.4    Passive exposure: Past   Smokeless Tobacco Never        CAGE Alcohol Screen:   CAGE screening score of 0 on 10/8/2024, showing low risk of alcohol abuse.      Patient Care Team:  Mayra Gay MD as PCP - General  Gianfranco Limon MD as Consulting Physician (SURGERY, GENERAL)  Magnus Potter MD as Consulting Physician (GASTROENTEROLOGY)  Sguey Vick MD (DERMATOLOGY)  Sugey Vick MD (DERMATOLOGY)  Alessia Burnette (OPHTHALMOLOGY)    Review of Systems  See hpi    Objective:   Physical Exam  General appearance: alert, appears stated age, and cooperative  Lungs: clear to auscultation bilaterally  Breasts:  symmetric, no masses, no tenderness, no nipple discharge  Heart: S1, S2 normal, no murmur, click, rub or gallop, regular rate and rhythm  Abdomen: soft, non-tender; bowel sounds normal; no masses,  no organomegaly  Extremities:  no edema, muscle strength normal.     /70 (BP Location: Right arm, Patient Position: Sitting, Cuff Size: adult)   Pulse 80   Temp 97.8 °F (36.6 °C) (Temporal)   Resp 20   Ht 5' 3\" (1.6 m)   Wt 147 lb (66.7 kg)   LMP  (LMP Unknown)   SpO2 99%   BMI 26.04 kg/m²  Estimated body mass index is 26.04 kg/m² as calculated from the following:    Height as of this encounter: 5' 3\" (1.6 m).    Weight as of this encounter: 147 lb (66.7  kg).    Medicare Hearing Assessment:   Hearing Screening    Time taken: 10/9/2024  9:27 AM  Screening Method: Finger Rub  Finger Rub Result: Pass               Assessment & Plan:   Sobia Licona is a 69 year old female who presents for a Medicare Assessment.     1. Encounter for annual health examination  Mammo done 7/2024 negative. Had dense breast advisory. Discussed with pt today. Not covered by insurance,she wants to hold off. Breast exam done today.   Declines pelvic. No symptoms.   Colonoscopy done 11/2022, repeat in 5 years. Had adenomas.   Dexa done 7/2022 showed osteopenia. Ordered.     Up to date tdap (done 4/2015).   Due shingrix #2. Had a reaction to shingrix #1 so holding off on shingrix #2. Had a rash.   Up to date prevnar 13 and pneumovax for now.   Got the covid booster.     AHA flowsheet reviewed.   No falls.   Memory testing normal.   Mood has been stable. No depression.   Seeing eye doctor yearly.      2. Hypercholesteremia  Continue simvastatin. She has lost weight and is working on exercise. If LDL still elevated we can go up on the simvastatin to 20mg at bedtime, discussed today and I will let her know based on results. She is agreeable.   - Comp Metabolic Panel (14)  - Lipid Panel    3. Postmenopausal  - XR DEXA BONE DENSITOMETRY (CPT=77080); Future    4. Encounter for screening mammogram for malignant neoplasm of breast  - [facility] 2D+3D SCREENING BILAT (CPT=77067/94905); Future      The patient indicates understanding of these issues and agrees to the plan.  Reinforced healthy diet, lifestyle, and exercise.      Return in about 1 year (around 10/9/2025) for physical.     Mayra Gay MD, 10/8/2024     Supplementary Documentation:   General Health:  In the past six months, have you lost more than 10 pounds without trying?: (Patient-Rptd) 2 - No  Has your appetite been poor?: (Patient-Rptd) No  Type of Diet: (Patient-Rptd) Balanced  How does the patient maintain a good energy level?: Daily  Walks;Appropriate Exercise  How would you describe your daily physical activity?: Moderate  How would you describe your current health state?: (Patient-Rptd) Good  How do you maintain positive mental well-being?: Social Interaction;Visiting Friends;Visiting Family  On a scale of 0 to 10, with 0 being no pain and 10 being severe pain, what is your pain level?: (Patient-Rptd) 0 - (None)  In the past six months, have you experienced urine leakage?: (Patient-Rptd) 0-No  At any time do you feel concerned for the safety/well-being of yourself and/or your children, in your home or elsewhere?: (Patient-Rptd) No  Have you had any immunizations at another office such as Influenza, Hepatitis B, Tetanus, or Pneumococcal?: (Patient-Rptd) Yes    Health Maintenance   Topic Date Due    DEXA Scan  Never done    Zoster Vaccines (3 of 3) 06/03/2019    Annual Depression Screening  01/01/2024    Fall Risk Screening (Annual)  01/01/2024    Annual Physical  09/05/2024    Influenza Vaccine (1) 10/01/2024    COVID-19 Vaccine (6 - 2023-24 season) 12/28/2024    Mammogram  07/17/2025    Colorectal Cancer Screening  11/17/2027    Pneumococcal Vaccine: 65+ Years  Completed

## 2024-10-08 NOTE — TELEPHONE ENCOUNTER
Incoming (mail or fax):  fax  Received from:  [facility]  Documentation given to:  results    pathology

## 2024-10-09 ENCOUNTER — OFFICE VISIT (OUTPATIENT)
Dept: INTERNAL MEDICINE CLINIC | Facility: CLINIC | Age: 69
End: 2024-10-09
Payer: MEDICARE

## 2024-10-09 VITALS
WEIGHT: 147 LBS | RESPIRATION RATE: 20 BRPM | BODY MASS INDEX: 26.05 KG/M2 | TEMPERATURE: 98 F | DIASTOLIC BLOOD PRESSURE: 70 MMHG | HEIGHT: 63 IN | OXYGEN SATURATION: 99 % | SYSTOLIC BLOOD PRESSURE: 126 MMHG | HEART RATE: 80 BPM

## 2024-10-09 DIAGNOSIS — E78.00 HYPERCHOLESTEREMIA: ICD-10-CM

## 2024-10-09 DIAGNOSIS — Z00.00 ENCOUNTER FOR ANNUAL HEALTH EXAMINATION: Primary | ICD-10-CM

## 2024-10-09 DIAGNOSIS — Z12.31 ENCOUNTER FOR SCREENING MAMMOGRAM FOR MALIGNANT NEOPLASM OF BREAST: ICD-10-CM

## 2024-10-09 DIAGNOSIS — Z78.0 POSTMENOPAUSAL: ICD-10-CM

## 2024-10-09 PROCEDURE — 99214 OFFICE O/P EST MOD 30 MIN: CPT | Performed by: INTERNAL MEDICINE

## 2024-10-09 PROCEDURE — G0439 PPPS, SUBSEQ VISIT: HCPCS | Performed by: INTERNAL MEDICINE

## 2024-10-09 RX ORDER — SIMVASTATIN 10 MG
10 TABLET ORAL
Qty: 90 TABLET | Refills: 1 | Status: CANCELLED | OUTPATIENT
Start: 2024-10-09

## 2024-10-11 LAB
ALBUMIN/GLOBULIN RATIO: 1.4 (CALC) (ref 1–2.5)
ALBUMIN: 4 G/DL (ref 3.6–5.1)
ALKALINE PHOSPHATASE: 62 U/L (ref 37–153)
ALT: 14 U/L (ref 6–29)
AST: 16 U/L (ref 10–35)
BILIRUBIN, TOTAL: 0.4 MG/DL (ref 0.2–1.2)
BUN: 12 MG/DL (ref 7–25)
CALCIUM: 9.1 MG/DL (ref 8.6–10.4)
CARBON DIOXIDE: 29 MMOL/L (ref 20–32)
CHLORIDE: 104 MMOL/L (ref 98–110)
CHOL/HDLC RATIO: 3.9 (CALC)
CHOLESTEROL, TOTAL: 225 MG/DL
CREATININE: 0.84 MG/DL (ref 0.5–1.05)
EGFR: 75 ML/MIN/1.73M2
GLOBULIN: 2.8 G/DL (CALC) (ref 1.9–3.7)
GLUCOSE: 91 MG/DL (ref 65–99)
HDL CHOLESTEROL: 57 MG/DL
LDL-CHOLESTEROL: 148 MG/DL (CALC)
NON-HDL CHOLESTEROL: 168 MG/DL (CALC)
POTASSIUM: 4.3 MMOL/L (ref 3.5–5.3)
PROTEIN, TOTAL: 6.8 G/DL (ref 6.1–8.1)
SODIUM: 140 MMOL/L (ref 135–146)
TRIGLYCERIDES: 95 MG/DL

## 2024-10-25 ENCOUNTER — MED REC SCAN ONLY (OUTPATIENT)
Dept: INTERNAL MEDICINE CLINIC | Facility: CLINIC | Age: 69
End: 2024-10-25

## 2025-01-13 RX ORDER — SIMVASTATIN 20 MG
20 TABLET ORAL NIGHTLY
Qty: 30 TABLET | Refills: 0 | Status: SHIPPED | OUTPATIENT
Start: 2025-01-13 | End: 2025-02-27

## 2025-01-13 NOTE — TELEPHONE ENCOUNTER
Cholesterol Medication Protocol [id number]/09/2025 04:14 AM   Protocol Details ALT < 80    ALT resulted within past year    Lipid panel within past 12 months    In person appointment or virtual visit in the past 12 mos or appointment in next 3 mos    Medication is active on med list        No future appointments.   Lov 10/9  2. Hypercholesteremia  Continue simvastatin. She has lost weight and is working on exercise. If LDL still elevated we can go up on the simvastatin to 20mg at bedtime, discussed today and I will let her know based on results. She is agreeable.   - Comp Metabolic Panel (14)  - Lipid Panel        Return in about 1 year (around 10/9/2025) for physical.   Mayra Gay MD  10/11/2024  1:25 PM CDT       Cholesterol has gone up. Increase simvastatin to 20mg at bedtime. Recheck hfp and lipid panel in 3 months. Please inform pt. We had discussed at her visit that if still elevated we would go up on the medication.  Please order.     30/0 refilled per protocl.  Mcm sent regarding labs.

## 2025-02-20 DIAGNOSIS — E78.00 HYPERCHOLESTEREMIA: Primary | ICD-10-CM

## 2025-02-20 NOTE — TELEPHONE ENCOUNTER
Last office visit: 10/9/2024   Protocol: fail  Requested medication(s) are due for refill today: yes  Requested medication(s) are on the active medication list same strength, form, dose/ sig: yes  Requested medication(s) are managed by provider: yes  Patient has already received a courtsey refill: no    NOV: none   Last Labs: 10/10/2024  Asked to Return: 10/9/2025    NEEDS REPEAT LIPID PANEL DRAWN

## 2025-02-27 RX ORDER — SIMVASTATIN 20 MG
20 TABLET ORAL NIGHTLY
Qty: 30 TABLET | Refills: 0 | Status: SHIPPED | OUTPATIENT
Start: 2025-02-27

## 2025-02-27 NOTE — TELEPHONE ENCOUNTER
Last OV relevant to medication:10/9/24 Annual Wellness Visit   Last refill date:1/13/25   30#/refills: 0  When pt was asked to return for OV:10/9/2025) for physical.   Upcoming appt/reason: N/A  Was pt informed of any over due labs:Yes spoke to patient not able to get labs done right now. Traveling out of town will be back mid- March   Lab Results   Component Value Date    CHOLEST 225 (H) 10/10/2024    TRIG 95 10/10/2024    HDL 57 10/10/2024     (H) 10/10/2024    TCHDLRATIO 3.9 10/10/2024    NONHDLC 168 (H) 10/10/2024

## 2025-03-12 DIAGNOSIS — E78.00 HYPERCHOLESTEREMIA: Primary | ICD-10-CM

## 2025-03-12 RX ORDER — SIMVASTATIN 40 MG
40 TABLET ORAL NIGHTLY
Qty: 90 TABLET | Refills: 0 | Status: SHIPPED | OUTPATIENT
Start: 2025-03-12 | End: 2025-03-12

## 2025-03-12 RX ORDER — SIMVASTATIN 20 MG
20 TABLET ORAL NIGHTLY
Qty: 90 TABLET | Refills: 0 | Status: SHIPPED | OUTPATIENT
Start: 2025-03-12

## 2025-04-29 ENCOUNTER — OFFICE VISIT (OUTPATIENT)
Dept: INTERNAL MEDICINE CLINIC | Facility: CLINIC | Age: 70
End: 2025-04-29
Payer: MEDICARE

## 2025-04-29 VITALS
BODY MASS INDEX: 26.57 KG/M2 | SYSTOLIC BLOOD PRESSURE: 122 MMHG | RESPIRATION RATE: 12 BRPM | TEMPERATURE: 98 F | DIASTOLIC BLOOD PRESSURE: 72 MMHG | HEIGHT: 63.25 IN | WEIGHT: 151.81 LBS | HEART RATE: 64 BPM

## 2025-04-29 DIAGNOSIS — N64.4 MASTODYNIA OF LEFT BREAST: Primary | ICD-10-CM

## 2025-04-29 DIAGNOSIS — G47.00 INSOMNIA, UNSPECIFIED TYPE: ICD-10-CM

## 2025-04-29 PROCEDURE — G2211 COMPLEX E/M VISIT ADD ON: HCPCS | Performed by: INTERNAL MEDICINE

## 2025-04-29 PROCEDURE — 99213 OFFICE O/P EST LOW 20 MIN: CPT | Performed by: INTERNAL MEDICINE

## 2025-04-29 NOTE — PROGRESS NOTES
[facility]    CHIEF COMPLAINT:    Chief Complaint   Patient presents with    Breast Problem     Tenderness in left breast x 1 month. Does not feel lump. 7/17/24-mammo. 11/17/22-colon-repeat 5 years              The following individual(s) verbally consented to be recorded using ambient AI listening technology and understand that they can each withdraw their consent to this listening technology at any point by asking the clinician to turn off or pause the recording:    Patient name: Sobia Licona  Additional names:  none          HISTORY OF PRESENT ILLNESS:  Complains of pain in left breast. Also insomnia.     History of Present Illness  She has been experiencing pain in her left breast for approximately one month. The pain is distinct from her usual sensations, not severe, and was first noticed in the shower. It is primarily felt when pressure is applied to the area, and feels deeper than just skin sensitivity. There is a sensation of heaviness in the breast, but no distinct lumps are felt. No associated arm or shoulder pain, and there is no history of trauma or specific incident that could have triggered the pain. No nipple discharge, fever, redness, or swelling of the breast. The pain is localized to the breast tissue and does not radiate to other areas. No recent changes in breast exam findings have been noted.    In addition to the breast pain, she has been experiencing difficulty with sleep over the past year, characterized by waking up shortly after falling asleep and being unable to return to sleep. This occurs sporadically, approximately once every couple of weeks. She typically goes to bed at 10 PM and wakes up around 10:30 PM, remaining awake for the rest of the night. She does not use any sleep aids regularly. She sleeps well on other nights, this is just once every couple of weeks.        REVIEW OF SYSTEMS:  See HPI    Current Medications:    Current Medications[1]    PAST MEDICAL, SOCIAL,  AND FAMILY HISTORY:  Tobacco:    History   Smoking Status    Former    Types: Cigarettes   Smokeless Tobacco    Never       PHYSICAL EXAM:  /72 (BP Location: Left arm, Patient Position: Sitting, Cuff Size: adult)   Pulse 64   Temp 97.7 °F (36.5 °C) (Temporal)   Resp 12   Ht 5' 3.25\" (1.607 m)   Wt 151 lb 12.8 oz (68.9 kg)   LMP  (LMP Unknown)   Breastfeeding No   BMI 26.68 kg/m²    GENERAL: well developed, well nourished,in no apparent distress  BREAST: symmetric, no masses, no nipple discharge. Right breast with no tenderness. Left breast with tenderness in the inner upper quadrant. No erythema, no swelling.     DATA:  Results for orders placed or performed in visit on 10/14/24   HEPATIC FUNCTION PANEL [94464][Q]    Collection Time: 03/11/25  8:37 AM   Result Value Ref Range    PROTEIN, TOTAL 6.9 6.1 - 8.1 g/dL    ALBUMIN 4.1 3.6 - 5.1 g/dL    GLOBULIN 2.8 1.9 - 3.7 g/dL (calc)    ALBUMIN/GLOBULIN RATIO 1.5 1.0 - 2.5 (calc)    BILIRUBIN, TOTAL 0.5 0.2 - 1.2 mg/dL    BILIRUBIN, DIRECT 0.1 < OR = 0.2 mg/dL    BILIRUBIN, INDIRECT 0.4 0.2 - 1.2 mg/dL (calc)    ALKALINE PHOSPHATASE 65 37 - 153 U/L    AST 18 10 - 35 U/L    ALT 21 6 - 29 U/L   LIPID PANEL [7600] [Q]    Collection Time: 03/11/25  8:37 AM   Result Value Ref Range    CHOLESTEROL, TOTAL 216 (H) <200 mg/dL    HDL CHOLESTEROL 55 > OR = 50 mg/dL    TRIGLYCERIDES 124 <150 mg/dL    LDL-CHOLESTEROL 136 (H) mg/dL (calc)    CHOL/HDLC RATIO 3.9 <5.0 (calc)    NON-HDL CHOLESTEROL 161 (H) <130 mg/dL (calc)            ASSESSMENT AND PLAN:    ICD-10-CM    1. Mastodynia of left breast  N64.4 LUKE MARY 2D+3D DIAGNOSTIC LUKE  BILAT (CPT=77066/90517)     Surgery Referral - In Network      2. Insomnia, unspecified type  G47.00           Assessment & Plan  Breast pain  Left breast pain for one month, likely due to cyst or inflamed tissue. Dense breast tissue may contribute.  - Order bilateral diagnostic mammogram with additional views.  - Refer to breast specialist,  Dr. Gaona, if mammogram is negative and pain persists.  - Advise warm compresses and supportive bra for symptom relief.  - if mammo negative Monitor symptoms for 3-4 weeks and if no improvement then see dr. Gaona.    Insomnia  Intermittent insomnia likely related to stress or subconscious anxiety. No chronic sleep disorder.  - Keep a sleep diary to identify triggers such as stress, diet, or caffeine.  - Implement sleep hygiene: maintain a dark room, avoid clocks, use dim light if necessary. No reading in bed.   - Avoid naps longer than 30 minutes during the day.  - Consider Benadryl on nights when unable to fall back asleep.  - Try calming techniques such as reading a book with dim light or listening to soft music.    Recording duration: 15 minutes       Return if symptoms worsen or fail to improve.      Mayra Gay MD         [1]   Current Outpatient Medications   Medication Sig Dispense Refill    simvastatin 20 MG Oral Tab Take 1 tablet (20 mg total) by mouth nightly. 90 tablet 0    desonide 0.05 % External Cream apply to red areas on groin twice a day for 2 weeks then stop      Multiple Vitamins-Minerals (MULTIVITAMIN ADULTS 50+) Oral Tab Take 1 tablet by mouth daily.      CALCIUM OR Take 1 tablet by mouth daily.      Betamethasone Valerate 0.12 % External Foam NBA EXT TO THE SCALP QHS PRN 1 Can 0    Hydrocortisone 1 % Rectal Cream Place 1 Application rectally daily as needed. 1 Tube 0

## 2025-05-19 ENCOUNTER — TELEPHONE (OUTPATIENT)
Dept: INTERNAL MEDICINE CLINIC | Facility: CLINIC | Age: 70
End: 2025-05-19

## 2025-05-19 DIAGNOSIS — R92.2 INCONCLUSIVE MAMMOGRAM: Primary | ICD-10-CM

## 2025-05-28 ENCOUNTER — MED REC SCAN ONLY (OUTPATIENT)
Dept: INTERNAL MEDICINE CLINIC | Facility: CLINIC | Age: 70
End: 2025-05-28

## 2025-06-19 LAB
ALBUMIN/GLOBULIN RATIO: 1.7 (CALC) (ref 1–2.5)
ALBUMIN: 4.5 G/DL (ref 3.6–5.1)
ALKALINE PHOSPHATASE: 58 U/L (ref 37–153)
ALT: 17 U/L (ref 6–29)
AST: 18 U/L (ref 10–35)
BILIRUBIN, DIRECT: 0.1 MG/DL
BILIRUBIN, INDIRECT: 0.4 MG/DL (CALC) (ref 0.2–1.2)
BILIRUBIN, TOTAL: 0.5 MG/DL (ref 0.2–1.2)
CHOL/HDLC RATIO: 3.5 (CALC)
CHOLESTEROL, TOTAL: 211 MG/DL
GLOBULIN: 2.7 G/DL (CALC) (ref 1.9–3.7)
HDL CHOLESTEROL: 60 MG/DL
LDL-CHOLESTEROL: 132 MG/DL (CALC)
NON-HDL CHOLESTEROL: 151 MG/DL (CALC)
PROTEIN, TOTAL: 7.2 G/DL (ref 6.1–8.1)
TRIGLYCERIDES: 88 MG/DL

## 2025-08-09 ENCOUNTER — MED REC SCAN ONLY (OUTPATIENT)
Dept: INTERNAL MEDICINE CLINIC | Facility: CLINIC | Age: 70
End: 2025-08-09

## 2025-08-20 RX ORDER — SIMVASTATIN 20 MG
20 TABLET ORAL NIGHTLY
Qty: 90 TABLET | Refills: 0 | Status: SHIPPED | OUTPATIENT
Start: 2025-08-20

## (undated) NOTE — LETTER
08/27/21        Edwardo Sargent  [address] [facility] [address]      Dear Candie Osborn,    This is a friendly reminder to let you know it's time to schedule your Annual Mammogram and your Dexa Scan.   To schedule an appointment please call [facility]

## (undated) NOTE — MR AVS SNAPSHOT
[address] [address] [phone]               Thank you for choosing us for your health care visit with Florence Edwards MD.  We are glad to serve you and happy to provide you with Return in about 1 year (around 3/7/2018) for physical or sooner if symptoms persist or worsen. .      Scheduling Instructions     Tuesday March 07, 2017     Imaging:  LUKE SCREENING BILAT ([id number])    Instructions:   To schedule an appointment for your rad Physical exam, annual        Screening mammogram, encounter for        Chronic right shoulder pain        Postmenopausal        Muscle twitch          Instructions and Information about Your Health     None      Allergies as of Mar 07, 2017     No Known A Eat plenty of protein, keep the fat content low Sugars:  sodas and sports drinks, candies and desserts   Eat plenty of low-fat dairy products High fat meats and dairy   Choose whole grain products Foods high in sodium   Water is best for hydration Fast keron